# Patient Record
Sex: FEMALE | Race: WHITE | NOT HISPANIC OR LATINO | Employment: OTHER | URBAN - METROPOLITAN AREA
[De-identification: names, ages, dates, MRNs, and addresses within clinical notes are randomized per-mention and may not be internally consistent; named-entity substitution may affect disease eponyms.]

---

## 2017-06-23 ENCOUNTER — HOSPITAL ENCOUNTER (OUTPATIENT)
Dept: FAMILY MEDICINE CLINIC | Facility: CLINIC | Age: 27
Setting detail: SPECIMEN
Discharge: HOME | End: 2017-06-23

## 2017-06-23 LAB
ALBUMIN SERPL-MCNC: 4.6 G/DL (ref 3.5–4.8)
ALBUMIN/GLOB SERPL: 1.5 {RATIO} (ref 1–1.7)
ALP SERPL-CCNC: 35 IU/L (ref 32–91)
ALT SERPL-CCNC: 20 IU/L (ref 14–54)
ANION GAP SERPL CALC-SCNC: 13.2 MMOL/L (ref 10–20)
AST SERPL-CCNC: 23 IU/L (ref 15–41)
BASOPHILS # BLD AUTO: 0 10*3/UL (ref 0–0.2)
BASOPHILS NFR BLD AUTO: 0 % (ref 0–2)
BILIRUB SERPL-MCNC: 0.7 MG/DL (ref 0.3–1.2)
BUN SERPL-MCNC: 11 MG/DL (ref 8–20)
BUN/CREAT SERPL: 13.8 (ref 5.4–26.2)
CALCIUM SERPL-MCNC: 10 MG/DL (ref 8.9–10.3)
CHLORIDE SERPL-SCNC: 103 MMOL/L (ref 101–111)
CHOLEST SERPL-MCNC: 160 MG/DL
CHOLEST/HDLC SERPL: 3 {RATIO}
CONV CO2: 27 MMOL/L (ref 22–32)
CONV LDL CHOLESTEROL DIRECT: 87 MG/DL (ref 0–100)
CONV TOTAL PROTEIN: 7.7 G/DL (ref 6.1–7.9)
CREAT UR-MCNC: 0.8 MG/DL (ref 0.4–1)
DIFFERENTIAL METHOD BLD: (no result)
EOSINOPHIL # BLD AUTO: 0.1 10*3/UL (ref 0–0.3)
EOSINOPHIL # BLD AUTO: 3 % (ref 0–3)
ERYTHROCYTE [DISTWIDTH] IN BLOOD BY AUTOMATED COUNT: 13.5 % (ref 11.5–14.5)
GLOBULIN UR ELPH-MCNC: 3.1 G/DL (ref 2.5–3.8)
GLUCOSE SERPL-MCNC: 93 MG/DL (ref 65–99)
HCT VFR BLD AUTO: 42 % (ref 35–49)
HDLC SERPL-MCNC: 54 MG/DL
HGB BLD-MCNC: 14.2 G/DL (ref 12–15)
LDLC/HDLC SERPL: 1.6 {RATIO}
LIPID INTERPRETATION: NORMAL
LYMPHOCYTES # BLD AUTO: 1.6 10*3/UL (ref 0.8–4.8)
LYMPHOCYTES NFR BLD AUTO: 30 % (ref 18–42)
MCH RBC QN AUTO: 29 PG (ref 26–32)
MCHC RBC AUTO-ENTMCNC: 33.7 G/DL (ref 32–36)
MCV RBC AUTO: 86.2 FL (ref 80–94)
MONOCYTES # BLD AUTO: 0.4 10*3/UL (ref 0.1–1.3)
MONOCYTES NFR BLD AUTO: 8 % (ref 2–11)
NEUTROPHILS # BLD AUTO: 3 10*3/UL (ref 2.3–8.6)
NEUTROPHILS NFR BLD AUTO: 59 % (ref 50–75)
NRBC BLD AUTO-RTO: 0 /100{WBCS}
NRBC/RBC NFR BLD MANUAL: 0 10*3/UL
PLATELET # BLD AUTO: 216 10*3/UL (ref 150–450)
PMV BLD AUTO: 8.2 FL (ref 7.4–10.4)
POTASSIUM SERPL-SCNC: 4.2 MMOL/L (ref 3.6–5.1)
RBC # BLD AUTO: 4.87 10*6/UL (ref 4–5.4)
SODIUM SERPL-SCNC: 139 MMOL/L (ref 136–144)
TRIGL SERPL-MCNC: 66 MG/DL
TSH SERPL-ACNC: 1.66 UIU/ML (ref 0.34–5.6)
VIT B12 SERPL-MCNC: 691 PG/ML (ref 180–914)
VLDLC SERPL CALC-MCNC: 19.2 MG/DL
WBC # BLD AUTO: 5.2 10*3/UL (ref 4.5–11.5)

## 2018-03-13 ENCOUNTER — HOSPITAL ENCOUNTER (OUTPATIENT)
Dept: FAMILY MEDICINE CLINIC | Facility: CLINIC | Age: 28
Setting detail: SPECIMEN
Discharge: HOME | End: 2018-03-13

## 2018-03-13 ENCOUNTER — HOSPITAL ENCOUNTER (OUTPATIENT)
Dept: GENERAL RADIOLOGY | Facility: HOSPITAL | Age: 28
Discharge: HOME | End: 2018-03-13

## 2019-08-07 ENCOUNTER — OFFICE VISIT (OUTPATIENT)
Dept: FAMILY MEDICINE CLINIC | Facility: CLINIC | Age: 29
End: 2019-08-07

## 2019-08-07 VITALS
HEART RATE: 73 BPM | DIASTOLIC BLOOD PRESSURE: 88 MMHG | OXYGEN SATURATION: 98 % | WEIGHT: 140 LBS | SYSTOLIC BLOOD PRESSURE: 123 MMHG | TEMPERATURE: 97.9 F | HEIGHT: 65 IN | BODY MASS INDEX: 23.32 KG/M2

## 2019-08-07 DIAGNOSIS — M79.89 LEFT AXILLARY SWELLING: Primary | ICD-10-CM

## 2019-08-07 LAB
BASOPHILS # BLD AUTO: 0.1 10*3/MM3 (ref 0–0.2)
BASOPHILS NFR BLD AUTO: 0.7 % (ref 0–1.5)
DEPRECATED RDW RBC AUTO: 41.6 FL (ref 37–54)
EOSINOPHIL # BLD AUTO: 0.1 10*3/MM3 (ref 0–0.4)
EOSINOPHIL NFR BLD AUTO: 2 % (ref 0.3–6.2)
ERYTHROCYTE [DISTWIDTH] IN BLOOD BY AUTOMATED COUNT: 13.7 % (ref 12.3–15.4)
HCT VFR BLD AUTO: 40 % (ref 34–46.6)
HGB BLD-MCNC: 12.9 G/DL (ref 12–15.9)
LYMPHOCYTES # BLD AUTO: 1.8 10*3/MM3 (ref 0.7–3.1)
LYMPHOCYTES NFR BLD AUTO: 23.9 % (ref 19.6–45.3)
MCH RBC QN AUTO: 27.6 PG (ref 26.6–33)
MCHC RBC AUTO-ENTMCNC: 32.2 G/DL (ref 31.5–35.7)
MCV RBC AUTO: 85.6 FL (ref 79–97)
MONOCYTES # BLD AUTO: 0.5 10*3/MM3 (ref 0.1–0.9)
MONOCYTES NFR BLD AUTO: 7 % (ref 5–12)
NEUTROPHILS # BLD AUTO: 5 10*3/MM3 (ref 1.7–7)
NEUTROPHILS NFR BLD AUTO: 66.4 % (ref 42.7–76)
NRBC BLD AUTO-RTO: 0 /100 WBC (ref 0–0.2)
PLATELET # BLD AUTO: 232 10*3/MM3 (ref 140–450)
PMV BLD AUTO: 7.7 FL (ref 6–12)
RBC # BLD AUTO: 4.67 10*6/MM3 (ref 3.77–5.28)
WBC NRBC COR # BLD: 7.5 10*3/MM3 (ref 3.4–10.8)

## 2019-08-07 PROCEDURE — 99213 OFFICE O/P EST LOW 20 MIN: CPT | Performed by: NURSE PRACTITIONER

## 2019-08-07 PROCEDURE — 85025 COMPLETE CBC W/AUTO DIFF WBC: CPT | Performed by: NURSE PRACTITIONER

## 2019-08-07 RX ORDER — MULTIVIT WITH MINERALS/LUTEIN
TABLET ORAL DAILY
COMMUNITY
End: 2020-04-15

## 2019-08-07 NOTE — PROGRESS NOTES
"Subjective   Demetra Lal is a 28 y.o. female.       HPI   Pt. Is here today with c/o a \"knot\" under her left arm with a rash.  She reports this has been there on and off for over a year.  She is currently 2 months postpartum.  She made her GYN aware of this problem while she was pregnant.  Given her mother's hx. Of breast cancer, an U/S and labwork were ordered; pt. Reports she was told it was only a benign cyst.   OB/GYN ([facility]).  She has continued to be bothered by this area; she says it \"flares up\" and becomes swollen and tender; the rash or reddness is present with the flare ups.  Last episode was 2 days ago.  She is currently breastfeeding.  She denies any fevers.      The following portions of the patient's history were reviewed and updated as appropriate: allergies, current medications, past family history, past medical history, past social history, past surgical history and problem list.    Review of Systems   Constitutional: Negative for activity change, appetite change, chills, diaphoresis, fatigue, fever, unexpected weight gain and unexpected weight loss.   HENT: Negative for congestion, ear pain, sinus pressure, sore throat, trouble swallowing and voice change.    Eyes: Negative for blurred vision, photophobia, pain, discharge, redness, itching and visual disturbance.   Respiratory: Negative for cough, shortness of breath and wheezing.    Cardiovascular: Negative for chest pain and palpitations.   Gastrointestinal: Negative for abdominal distention, abdominal pain, constipation, diarrhea, nausea and vomiting.   Endocrine: Negative for cold intolerance and heat intolerance.   Genitourinary: Negative for decreased urine volume, dysuria, frequency, hematuria and urgency.   Musculoskeletal: Negative for arthralgias, gait problem, joint swelling and myalgias.   Skin: Positive for rash (left axilla) and skin lesions (left axilla). Negative for color change.   Allergic/Immunologic: Negative for " environmental allergies and food allergies.   Neurological: Negative for dizziness, weakness, headache and confusion.   Hematological: Negative for adenopathy.   Psychiatric/Behavioral: Negative for depressed mood. The patient is not nervous/anxious.        Objective   Physical Exam   Constitutional: She is oriented to person, place, and time. She appears well-developed and well-nourished. No distress.   HENT:   Head: Normocephalic and atraumatic.   Eyes: EOM are normal. Pupils are equal, round, and reactive to light.   Neck: Normal range of motion. Neck supple. No JVD present. No thyromegaly present.   Cardiovascular: Normal rate, regular rhythm and normal heart sounds.   Pulmonary/Chest: Effort normal and breath sounds normal.   Abdominal: Soft. Bowel sounds are normal. She exhibits no distension. There is no tenderness.   Musculoskeletal: Normal range of motion.   Lymphadenopathy:     She has no cervical adenopathy.   Neurological: She is alert and oriented to person, place, and time.   Skin: Skin is warm and dry. No rash (no rash noted in left axilla currently; does have a 5-6mm freely moveable, nontender nodule; no erythema or warmth. ) noted. No erythema.   Psychiatric: She has a normal mood and affect.         Assessment/Plan   Demetra was seen today for mass and rash.    Diagnoses and all orders for this visit:    Left axillary swelling  Comments:  Will get U/S and CBC today;  consider referral to Gen. Surg.   Orders:  -     US Axilla Left; Future  -     CBC Auto Differential

## 2019-08-09 ENCOUNTER — HOSPITAL ENCOUNTER (OUTPATIENT)
Dept: ULTRASOUND IMAGING | Facility: HOSPITAL | Age: 29
Discharge: HOME | End: 2019-08-09
Admitting: NURSE PRACTITIONER

## 2019-08-09 DIAGNOSIS — M79.89 LEFT AXILLARY SWELLING: ICD-10-CM

## 2019-08-09 PROCEDURE — 76882 US LMTD JT/FCL EVL NVASC XTR: CPT

## 2019-08-14 DIAGNOSIS — R59.1 LYMPHADENOPATHY: Primary | ICD-10-CM

## 2019-08-20 ENCOUNTER — OFFICE VISIT (OUTPATIENT)
Dept: SURGERY | Facility: CLINIC | Age: 29
End: 2019-08-20

## 2019-08-20 VITALS
HEIGHT: 65 IN | WEIGHT: 129.2 LBS | HEART RATE: 83 BPM | TEMPERATURE: 97.9 F | DIASTOLIC BLOOD PRESSURE: 81 MMHG | OXYGEN SATURATION: 99 % | SYSTOLIC BLOOD PRESSURE: 114 MMHG | BODY MASS INDEX: 21.52 KG/M2

## 2019-08-20 DIAGNOSIS — R59.0 AXILLARY LYMPHADENOPATHY: Primary | ICD-10-CM

## 2019-08-20 PROBLEM — F41.9 ANXIETY: Status: ACTIVE | Noted: 2017-06-22

## 2019-08-20 PROCEDURE — 99204 OFFICE O/P NEW MOD 45 MIN: CPT | Performed by: SURGERY

## 2019-08-20 RX ORDER — FERROUS SULFATE 325(65) MG
325 TABLET ORAL
COMMUNITY
End: 2020-03-20

## 2019-08-20 NOTE — PROGRESS NOTES
"Whitney Lal is a 28 y.o. female.   Chief Complaint   Patient presents with   • Swollen Glands     left armpit     /81 (BP Location: Right arm, Patient Position: Sitting, Cuff Size: Adult)   Pulse 83   Temp 97.9 °F (36.6 °C) (Oral)   Ht 165.1 cm (65\")   Wt 58.6 kg (129 lb 3.2 oz)   SpO2 99%   BMI 21.50 kg/m²     HISTORY OF PRESENT ILLNESS:  28-year-old lady who has been referred to me from her primary provider for evaluation of left axillary lymphadenopathy.  She says that she has had issues in this location for about the last 2 years.  She says that it has intermittent swelling and when it does swell up it causes pain.  The pain is usually worse with strenuous activity like exercise and is alleviated by rest and ibuprofen.  She does have some redness when it flares up but has never had any spontaneous drainage or needed any procedures in this location.  Over the last 6 months she does say that this is flared back up and has been giving her similar symptoms.  She has had a work-up which is included a left axillary ultrasound which demonstrated a 2.1 x 1 cm lymph node with benign characteristics and a second lymph node adjacent to this location is 1.5 x 0.7 cm.  She had this worked up a couple of years ago including a left breast ultrasound targeting the left upper outer quadrant including the axillary tail which was negative and did not comment on any abnormality.  She recently had her second child and has been breast-feeding over the course of the last 6 months and has had some intermittent breast discomfort but no infections or mastitis.  She does say that she intermittently has some pain in the mid outer aspect of her left breast.    There is a strong family history of breast cancer including a recent diagnosis of breast cancer in her mother at the age of late 40s and has been taking care of at the [facility].  She also reports multiple other family members with cancer " including her maternal grandfather who had breast cancer.  She also endorses multiple family members with lung cancer and also breast cancer on her paternal side of the family.  She says that her mother underwent genetic testing but was negative for BRCA mutations, but I do not have access to these reports at this time.      Outpatient Encounter Medications as of 8/20/2019   Medication Sig Dispense Refill   • ascorbic acid (VITAMIN C) 1000 MG tablet Take  by mouth Daily.     • ferrous sulfate 325 (65 FE) MG tablet Take 325 mg by mouth Daily With Breakfast.     • sertraline (ZOLOFT) 50 MG tablet TAKE TABLET BY MOUTH DAILY FOR 4 TO 7 DAYS THEN INCREASE TO 1 TABLET DAILY IF NECESSARY  12     No facility-administered encounter medications on file as of 8/20/2019.          The following portions of the patient's history were reviewed and updated as appropriate: allergies, current medications, past family history, past medical history, past social history, past surgical history and problem list.    Review of Systems  A complete review of systems been obtained is positive for depression headache gas abdominal pain and the rash in the left axilla but otherwise the review of systems has been negative.  Objective   Physical Exam   Constitutional: She appears well-developed and well-nourished.   HENT:   Head: Normocephalic and atraumatic.   Eyes: Conjunctivae and EOM are normal.   Neck: Neck supple.   Cardiovascular: Normal rate and regular rhythm.   No murmur heard.  Pulmonary/Chest: Effort normal and breath sounds normal. No respiratory distress.   She has some breast asymmetry right side larger than left with no skin changes.  In the left outer aspect of her mid left breast there is about a 1 to 2 cm area of fullness with mild tenderness to palpation and no overlying skin changes.  Within the left axilla there is a large palpable mass.  There is some slight redness along her skin folds but no evidence of infection.  Within  the right breast there is no appreciable masses and there is no right axillary lymphadenopathy appreciated.   Abdominal: Soft. She exhibits no distension. There is no tenderness.   Musculoskeletal: She exhibits no edema or deformity.   Lymphadenopathy:     She has no cervical adenopathy.   Neurological: She is alert. No cranial nerve deficit.   Skin: Skin is warm and dry.   Psychiatric: She has a normal mood and affect. Her behavior is normal.         Assessment/Plan   Demetra was seen today for swollen glands.    Diagnoses and all orders for this visit:    Axillary lymphadenopathy  -     Mammo Screening Bilateral With CAD; Future  -     US Breast Bilateral Complete; Future    Patient was seen today for left axillary lymphadenopathy.  She does have a significant family history of breast cancer and she says that she has never had any breast imaging.  Although there were benign characteristics on the left axillary ultrasound a 2 cm axillary lymph node that is symptomatic in addition to the fullness in the left outer aspect of her left breast does warrant investigation for breast cancer.  I will start with mammogram and if her breasts are too dense may consider ultrasound versus MRI.  If all these are negative I may proceed with an excisional biopsy.  We have discussed the risks and benefits of watchful waiting versus excisional biopsy including the risk of lymphocele or lymphedema by removing these lymph nodes.  She says that her mother has gone through lymphedema after her axillary surgery so she understands the risks.  We will tentatively schedule her for for 2-week follow-up.     Tunde Brown MD  8/20/2019  1:30 PM

## 2019-08-21 DIAGNOSIS — R59.0 AXILLARY LYMPHADENOPATHY: Primary | ICD-10-CM

## 2019-09-10 ENCOUNTER — HOSPITAL ENCOUNTER (OUTPATIENT)
Dept: MAMMOGRAPHY | Facility: HOSPITAL | Age: 29
Discharge: HOME | End: 2019-09-10
Admitting: SURGERY

## 2019-09-10 ENCOUNTER — HOSPITAL ENCOUNTER (OUTPATIENT)
Dept: ULTRASOUND IMAGING | Facility: HOSPITAL | Age: 29
Discharge: HOME | End: 2019-09-10

## 2019-09-10 DIAGNOSIS — R59.0 AXILLARY LYMPHADENOPATHY: ICD-10-CM

## 2019-09-10 DIAGNOSIS — R22.32 MASS OF LEFT AXILLA: ICD-10-CM

## 2019-09-10 PROCEDURE — 76882 US LMTD JT/FCL EVL NVASC XTR: CPT

## 2019-09-10 PROCEDURE — 77066 DX MAMMO INCL CAD BI: CPT

## 2019-09-10 PROCEDURE — 76642 ULTRASOUND BREAST LIMITED: CPT

## 2019-09-10 PROCEDURE — G0279 TOMOSYNTHESIS, MAMMO: HCPCS

## 2019-09-12 ENCOUNTER — OFFICE VISIT (OUTPATIENT)
Dept: SURGERY | Facility: CLINIC | Age: 29
End: 2019-09-12

## 2019-09-12 VITALS
OXYGEN SATURATION: 98 % | HEART RATE: 67 BPM | TEMPERATURE: 97.9 F | DIASTOLIC BLOOD PRESSURE: 71 MMHG | SYSTOLIC BLOOD PRESSURE: 116 MMHG

## 2019-09-12 DIAGNOSIS — R59.0 AXILLARY LYMPHADENOPATHY: Primary | ICD-10-CM

## 2019-09-12 PROCEDURE — 99213 OFFICE O/P EST LOW 20 MIN: CPT | Performed by: SURGERY

## 2019-09-12 NOTE — PROGRESS NOTES
Subjective   Demetra Lal is a 28 y.o. female.   28-year-old lady here to follow-up on the images obtained since her last office visit.  She says that she has had no changes in her left arm discomfort and that it still intermittently flares up and causes discomfort and then will ease off and cause no pain at all.  She has never had any drainage under her arm.  The ultrasound and mammogram did not show any evidence of abnormality in the breasts including in the left breast lump at the 2 to 3 o'clock position 5 cm from the nipple and has sonographically benign-appearing lymph nodes within the left axilla.      Objective   /71   Pulse 67   Temp 97.9 °F (36.6 °C) (Oral)   LMP 08/29/2019   SpO2 98%   Physical Exam   Constitutional: She appears well-developed and well-nourished.   HENT:   Head: Normocephalic and atraumatic.   Cardiovascular: Normal rate.   Pulmonary/Chest: Effort normal. No respiratory distress.   Lymphadenopathy:   Within the left axilla there is no appreciable sebaceous cyst or mass.  There are no firm matted lymph nodes that are confidently palpable.  In the right axilla there is a similar contour and no significant masses are appreciated.       Assessment/Plan   Demetra was seen today for follow-up.    Diagnoses and all orders for this visit:    Axillary lymphadenopathy  -     Ambulatory Referral to Oncology    Since these lymph nodes have a benign appearance on ultrasound and there is no evidence of breast cancer based on her mammogram and ultrasounds I have spent time today counseling her about the risks and benefits of proceeding with an excisional lymph node biopsy versus watchful waiting.  Since these are not immediately palpable and obvious I do think that there is some risk to performing a axillary excisional lymph node biopsy including the risk of a seroma wound complication like infection or even the risk of lymphedema.  She does have a concern about this since her mother has  dealt with lymphedema as part of her treatment for breast cancer.  The lymph nodes are minimally symptomatic and so at this point she and I both feel that it is not worth the risk of the biopsy.  Because she does have a strong family history of breast cancer although there has been no identified genetic mutation based on her report I have offered to refer her over to Dr. Oswald to discuss high risk breast cancer screening including obtaining an MRI.  She says that she was previously scheduled to see Dr. Oswald in the past for the same reason but after her mother's genetic profile came back and did not have an identifiable genetic mutation that she canceled the appointment.  For now we will have her follow-up with me as needed for any changes in her left axillary symptoms and defer to Dr. Oswald to discuss the risks and benefits of beginning or deferring MRI screening.     Tunde Brown MD  9/12/2019  4:38 PM

## 2019-10-02 NOTE — PROGRESS NOTES
Hematology/Oncology Outpatient Consultation    Patient name: Demetra Lal  : 1990  MRN: [id number]  Primary Care Physician: Suzanne Harris APRN  Referring Physician: Suzanne Harris APRN  Reason For Consult:     Chief Complaint   Patient presents with   • Consult     Axillary lymphadenopathy       History of Present Illness:  Is a 28-year-old female his mother developed breast cancer at the age of 48.  Patient developed left axillary node for 1 year ago.  Patient had delivered a baby boy about 5 months ago.  She has noted left axillary swelling on and off sometimes associated with inflammation over the past 1 year.  She has occasional pain with weight.  She was at least seen by Dr. Tunde Brown for the left axillary yousif swelling and he performed breast exam and found a nodule on the left breast at the 2 to 3 o'clock position 5 cm from the nipple.  Patient also is currently nursing her infant and states that the left breast lump comes and goes.  She denies any bloody nipple discharge or breast pain.  For the above complaints she had these imaging studies:  · 2019-ultrasound of the left axilla on lymph nodes that appeared benign largest measuring 2.1 cm x 1.1 cm.  · 9/10/2019-ultrasound of the left breast did not show any sonographic correlate to the palpable left breast lump which was noted at the 2 to 3 o'clock position 5 cm from the nipple.  Patient also had bilateral diagnostic digital mammogram which did not show any mammographic correlate to the palpable breast lump.  The right breast was normal.      Past Medical History:   Diagnosis Date   • Allergic    • Anemia    • Anemia    • Anxiety    • Asthma    • Celiac disease    • Depression    • Headache        No past surgical history on file.      Current Outpatient Medications:   •  ascorbic acid (VITAMIN C) 1000 MG tablet, Take  by mouth Daily., Disp: , Rfl:   •  Cholecalciferol (VITAMIN D) 1000 units tablet, Take 1,000 Units by mouth  Daily., Disp: , Rfl:   •  ferrous sulfate 325 (65 FE) MG tablet, Take 325 mg by mouth Daily With Breakfast., Disp: , Rfl:   •  Flax Oil-Fish Oil-Borage Oil (FISH-FLAX-BORAGE PO), Take  by mouth., Disp: , Rfl:   •  IRON PO, Take 65 mg by mouth Daily., Disp: , Rfl:   •  LYSINE PO, Take 500 mg by mouth Daily., Disp: , Rfl:   •  NON FORMULARY, P[employer] cleanse, Disp: , Rfl:   •  Prenatal Vit-Fe Fumarate-FA (PRENATAL, CLASSIC, VITAMIN) 28-0.8 MG tablet tablet, Take  by mouth Daily., Disp: , Rfl:   •  Probiotic Product (PROBIOTIC PO), Take  by mouth., Disp: , Rfl:   •  sertraline (ZOLOFT) 50 MG tablet, TAKE TABLET BY MOUTH DAILY FOR 4 TO 7 DAYS THEN INCREASE TO 1 TABLET DAILY IF NECESSARY, Disp: , Rfl: 12  •  Unable to find, 1 each 1 (One) Time. Celiac + Gut Health, Disp: , Rfl:   •  vitamin B-6 (PYRIDOXINE) 100 MG tablet, Take 100 mg by mouth Daily., Disp: , Rfl:     Allergies   Allergen Reactions   • Gluten Meal Swelling         There is no immunization history on file for this patient.    Family History   Problem Relation Age of Onset   • Diabetes Mother    • Miscarriages / Stillbirths Mother    • Breast cancer Mother 48   • Hypertension Father    • Lung cancer Maternal Grandmother 45        62   • Breast cancer Maternal Grandfather        Cancer-related family history includes Breast cancer in her maternal grandfather; Breast cancer (age of onset: 48) in her mother; Lung cancer (age of onset: 45) in her maternal grandmother.    Social History     Tobacco Use   • Smoking status: Never Smoker   • Smokeless tobacco: Never Used   Substance Use Topics   • Alcohol use: Yes     Frequency: 2-4 times a month     Drinks per session: 1 or 2   • Drug use: No       ROS:    Review of Systems   Constitutional: Negative for chills and fever.   HENT: Negative for ear pain, mouth sores, nosebleeds and sore throat.    Eyes: Negative for photophobia and visual disturbance.   Respiratory: Negative for wheezing and stridor.   "  Cardiovascular: Negative for chest pain and palpitations.   Gastrointestinal: Negative for abdominal pain, diarrhea, nausea and vomiting.   Endocrine: Negative for cold intolerance and heat intolerance.   Genitourinary: Negative for dysuria and hematuria.   Musculoskeletal: Negative for joint swelling and neck stiffness.   Skin: Negative for color change and rash.   Neurological: Negative for seizures and syncope.   Hematological: Negative for adenopathy.        No obvious bleeding   Psychiatric/Behavioral: Negative for agitation, confusion and hallucinations.       Objective:    Vitals:    10/03/19 1047   BP: 126/75   Pulse: 77   Resp: 18   Temp: 98.2 °F (36.8 °C)   Weight: 59.1 kg (130 lb 6.4 oz)   Height: 165.1 cm (65\")   PainSc: 0-No pain       ECOG  (0) Fully active, able to carry on all predisease performance without restriction    Physical Exam:    Physical Exam   Constitutional: She is oriented to person, place, and time. No distress.   HENT:   Head: Normocephalic and atraumatic.   Eyes: Conjunctivae and EOM are normal. Right eye exhibits no discharge. Left eye exhibits no discharge. No scleral icterus.   Neck: Normal range of motion. Neck supple. No thyromegaly present.   Cardiovascular: Normal rate, regular rhythm and normal heart sounds. Exam reveals no gallop and no friction rub.   Pulmonary/Chest: Effort normal. No stridor. No respiratory distress. She has no wheezes. Right breast exhibits no inverted nipple, no mass, no nipple discharge, no skin change and no tenderness. Left breast exhibits no inverted nipple, no mass, no nipple discharge, no skin change and no tenderness.   Abdominal: Soft. Bowel sounds are normal. She exhibits no mass. There is no tenderness. There is no rebound and no guarding.   Musculoskeletal: Normal range of motion. She exhibits no tenderness.   Lymphadenopathy:     She has no cervical adenopathy.   Neurological: She is alert and oriented to person, place, and time. She " exhibits normal muscle tone.   Skin: Skin is warm. No rash noted. She is not diaphoretic. No erythema.   Psychiatric: She has a normal mood and affect. Her behavior is normal.   Nursing note and vitals reviewed.    Bilateral breast exam today I do not palpate any masses, nipple discharge, she does not have a skin discoloration.  Bilateral axilla was negative for lumps.    RECENT LABS  WBC   Date Value Ref Range Status   08/07/2019 7.50 3.40 - 10.80 10*3/mm3 Final     RBC   Date Value Ref Range Status   08/07/2019 4.67 3.77 - 5.28 10*6/mm3 Final     Hemoglobin   Date Value Ref Range Status   08/07/2019 12.9 12.0 - 15.9 g/dL Final     Hematocrit   Date Value Ref Range Status   08/07/2019 40.0 34.0 - 46.6 % Final     MCV   Date Value Ref Range Status   08/07/2019 85.6 79.0 - 97.0 fL Final     MCH   Date Value Ref Range Status   08/07/2019 27.6 26.6 - 33.0 pg Final     MCHC   Date Value Ref Range Status   08/07/2019 32.2 31.5 - 35.7 g/dL Final     RDW   Date Value Ref Range Status   08/07/2019 13.7 12.3 - 15.4 % Final     RDW-SD   Date Value Ref Range Status   08/07/2019 41.6 37.0 - 54.0 fl Final     MPV   Date Value Ref Range Status   08/07/2019 7.7 6.0 - 12.0 fL Final     Platelets   Date Value Ref Range Status   08/07/2019 232 140 - 450 10*3/mm3 Final     Neutrophil %   Date Value Ref Range Status   08/07/2019 66.4 42.7 - 76.0 % Final     Lymphocyte %   Date Value Ref Range Status   08/07/2019 23.9 19.6 - 45.3 % Final     Monocyte %   Date Value Ref Range Status   08/07/2019 7.0 5.0 - 12.0 % Final     Eosinophil %   Date Value Ref Range Status   08/07/2019 2.0 0.3 - 6.2 % Final     Basophil %   Date Value Ref Range Status   08/07/2019 0.7 0.0 - 1.5 % Final     Neutrophils, Absolute   Date Value Ref Range Status   08/07/2019 5.00 1.70 - 7.00 10*3/mm3 Final     Lymphocytes, Absolute   Date Value Ref Range Status   08/07/2019 1.80 0.70 - 3.10 10*3/mm3 Final     Monocytes, Absolute   Date Value Ref Range Status    08/07/2019 0.50 0.10 - 0.90 10*3/mm3 Final     Eosinophils, Absolute   Date Value Ref Range Status   08/07/2019 0.10 0.00 - 0.40 10*3/mm3 Final     Basophils, Absolute   Date Value Ref Range Status   08/07/2019 0.10 0.00 - 0.20 10*3/mm3 Final     nRBC   Date Value Ref Range Status   08/07/2019 0.0 0.0 - 0.2 /100 WBC Final       Lab Results   Component Value Date    GLUCOSE 93 06/23/2017    BUN 11 06/23/2017    CREATININE 0.8 06/23/2017    BCR 13.8 06/23/2017    K 4.2 06/23/2017    CO2 27 06/23/2017    CALCIUM 10.0 06/23/2017    ALBUMIN 4.6 06/23/2017    LABIL2 1.5 06/23/2017    AST 23 06/23/2017    ALT 20 06/23/2017         Assessment/Plan     There are no diagnoses linked to this encounter.    1. Left axillary yousif enlargement with ultrasound findings suggestive of benign nodes  2. History of left breast nodule now resolved may be a milk duct related.  Bilateral clinical breast exam was unremarkable  3. Family history of breast cancer in multiple maternal relatives including her mom at age of 48, maternal grandfather, maternal great-grandmother, and maternal great aunts all had breast cancer.  Patient's mother had genetic testing which was negative.  Patient has been encouraged to bring the reports in.    Discussion:    Schedule patient for bilateral breast MRI.  We will also notify radiology department about her nursing status.  She understands the limitation of mammogram also with nursing.  The left axillary lymph node appears benign based on ultrasound report.  Patient had been seen by .  Biopsy has not been recommended.  I have encouraged her to return with a copy of her mother's genetic results.  Given her strong family history patient risk may be increased.  Will complete Macy Larsen risk assessment after her mother's genetic results have been reviewed      I have reviewed labs results, imaging, vitals, and medications with the patient today.  Will follow up in 3 weeks  with me.        Patient  verbalized understanding and is in agreement of the above plan.        Part of this document was scribed by Shelbi Molina RN, BSN.        Much of the above report is an electronic transcription/translation of the spoken language to printed text using Dragon Software. As such, the subtleties and finesse of the spoken language may permit erroneous, or at times, nonsensical words or phrases to be inadvertently transcribed; thus changes may be made at a later date to rectify these errors.

## 2019-10-03 ENCOUNTER — APPOINTMENT (OUTPATIENT)
Dept: LAB | Facility: HOSPITAL | Age: 29
End: 2019-10-03

## 2019-10-03 ENCOUNTER — CONSULT (OUTPATIENT)
Dept: ONCOLOGY | Facility: CLINIC | Age: 29
End: 2019-10-03

## 2019-10-03 VITALS
HEIGHT: 65 IN | DIASTOLIC BLOOD PRESSURE: 75 MMHG | TEMPERATURE: 98.2 F | BODY MASS INDEX: 21.73 KG/M2 | SYSTOLIC BLOOD PRESSURE: 126 MMHG | RESPIRATION RATE: 18 BRPM | WEIGHT: 130.4 LBS | HEART RATE: 77 BPM

## 2019-10-03 DIAGNOSIS — N64.4 MASTALGIA: Primary | ICD-10-CM

## 2019-10-03 PROCEDURE — 99205 OFFICE O/P NEW HI 60 MIN: CPT | Performed by: INTERNAL MEDICINE

## 2019-10-03 RX ORDER — PRENATAL VIT NO.126/IRON/FOLIC 28MG-0.8MG
TABLET ORAL DAILY
COMMUNITY
End: 2020-03-20

## 2019-10-03 RX ORDER — MULTIVITAMIN WITH IRON
100 TABLET ORAL DAILY
COMMUNITY
End: 2020-04-15

## 2019-10-04 ENCOUNTER — TELEPHONE (OUTPATIENT)
Dept: ONCOLOGY | Facility: CLINIC | Age: 29
End: 2019-10-04

## 2019-10-04 DIAGNOSIS — N64.4 MASTALGIA: Primary | ICD-10-CM

## 2019-10-04 DIAGNOSIS — R92.8 ABNORMAL ULTRASOUND OF BREAST: ICD-10-CM

## 2019-10-04 NOTE — TELEPHONE ENCOUNTER
I spoke with the patient at her last visit and she had concern with doing the Breast MRI that Dr. Oswald has ordered due to the cost.  The patient stated that she may have difficulty paying for this.  I spoke with Dr. Oswald and she stated that the patient would need this because  felt a lump and it was seen on the ultrasound.  The patient voiced understanding.  I let her know that someone would call her to schedule this.  Instructed her to speak with the billing department of [facility] so they could assess her financial situation to help with the cost.  The patient asked if she could call her insurance.  I let her know that it would be fine to speak to them as well.

## 2019-10-14 ENCOUNTER — TELEPHONE (OUTPATIENT)
Dept: ONCOLOGY | Facility: CLINIC | Age: 29
End: 2019-10-14

## 2019-10-14 NOTE — TELEPHONE ENCOUNTER
Ms. Lal left message to cancel her appt w/ Dr. Oswald.  Returned call.  She will call back at later date to reschedule.

## 2019-10-15 ENCOUNTER — APPOINTMENT (OUTPATIENT)
Dept: MRI IMAGING | Facility: HOSPITAL | Age: 29
End: 2019-10-15

## 2019-12-17 ENCOUNTER — OFFICE VISIT (OUTPATIENT)
Dept: FAMILY MEDICINE CLINIC | Facility: CLINIC | Age: 29
End: 2019-12-17

## 2019-12-17 VITALS
HEART RATE: 78 BPM | BODY MASS INDEX: 21.66 KG/M2 | SYSTOLIC BLOOD PRESSURE: 125 MMHG | HEIGHT: 65 IN | TEMPERATURE: 98.2 F | OXYGEN SATURATION: 98 % | WEIGHT: 130 LBS | DIASTOLIC BLOOD PRESSURE: 73 MMHG

## 2019-12-17 DIAGNOSIS — R05.9 COUGH: ICD-10-CM

## 2019-12-17 DIAGNOSIS — J01.90 ACUTE NON-RECURRENT SINUSITIS, UNSPECIFIED LOCATION: Primary | ICD-10-CM

## 2019-12-17 PROCEDURE — 99213 OFFICE O/P EST LOW 20 MIN: CPT | Performed by: NURSE PRACTITIONER

## 2019-12-17 RX ORDER — BENZONATATE 100 MG/1
100 CAPSULE ORAL 3 TIMES DAILY PRN
Qty: 30 CAPSULE | Refills: 0 | Status: SHIPPED | OUTPATIENT
Start: 2019-12-17 | End: 2019-12-17

## 2019-12-17 RX ORDER — AMOXICILLIN AND CLAVULANATE POTASSIUM 875; 125 MG/1; MG/1
1 TABLET, FILM COATED ORAL 2 TIMES DAILY
Qty: 20 TABLET | Refills: 0 | Status: SHIPPED | OUTPATIENT
Start: 2019-12-17 | End: 2019-12-27

## 2019-12-17 NOTE — PROGRESS NOTES
"Whitney Lal is a 29 y.o. female.     Pt is here today with c/o nasal congestion and cough.  Pt states that her symptoms started about 2.5 weeks ago.  Her 6mo son was diagnosed with RSV.  She states that her symptoms have been worsening.  She has tried using some breathing treatments, without much relief.    She reports that cough is worse at night and in the morning, but still coughs frequently throughout the day.  She has increased fatigue.  She has been coughing up thick phlegm.  She has had a fever on and off.  She has had nasal congestion and drainage.  Reports a sore throat, ear pressure, and body aches.  She has tried taking dayquil with Vicks, tylenol cold and flu, emergenC, OTC cough syrup.       The following portions of the patient's history were reviewed and updated as appropriate: allergies, current medications, past family history, past medical history, past social history, past surgical history and problem list.    Review of Systems   Constitutional: Positive for chills, fatigue and fever.   HENT: Positive for congestion, ear pain, postnasal drip, rhinorrhea, sinus pressure and sore throat.    Eyes: Negative for blurred vision and double vision.   Respiratory: Positive for cough, chest tightness, shortness of breath and wheezing.    Cardiovascular: Negative for chest pain and palpitations.   Gastrointestinal: Positive for abdominal pain and diarrhea. Negative for constipation, nausea and vomiting.   Musculoskeletal: Positive for myalgias.   Neurological: Positive for dizziness (off and on, mild) and headache.       Objective   /73 (BP Location: Right arm, Patient Position: Sitting, Cuff Size: Adult)   Pulse 78   Temp 98.2 °F (36.8 °C) (Oral)   Ht 165.1 cm (65\")   Wt 59 kg (130 lb)   SpO2 98%   BMI 21.63 kg/m²   Physical Exam   Constitutional: She is oriented to person, place, and time. She appears well-developed and well-nourished. No distress.   HENT:   Head: " Normocephalic and atraumatic.   Right Ear: External ear normal.   Left Ear: External ear normal.   Mouth/Throat: Oropharynx is clear and moist.   Eyes: Pupils are equal, round, and reactive to light. Conjunctivae and EOM are normal. Right eye exhibits no discharge. Left eye exhibits no discharge.   Neck: Normal range of motion. Neck supple.   Cardiovascular: Normal rate and regular rhythm.   Pulmonary/Chest: Effort normal and breath sounds normal. No respiratory distress. She has no wheezes. She has no rales.   Abdominal: Soft. Normal appearance and bowel sounds are normal. She exhibits no distension. There is no tenderness.   Musculoskeletal: Normal range of motion.   Neurological: She is alert and oriented to person, place, and time.   Skin: Skin is warm and dry. She is not diaphoretic.   Psychiatric: She has a normal mood and affect. Her behavior is normal. Thought content normal.   Vitals reviewed.        Assessment/Plan     Diagnoses and all orders for this visit:    1. Acute non-recurrent sinusitis, unspecified location (Primary)  Comments:  start augmentin  breastfeeding so unable to take stahist or tessalon perles  drink plenty of water  Orders:  -     amoxicillin-clavulanate (AUGMENTIN) 875-125 MG per tablet; Take 1 tablet by mouth 2 (Two) Times a Day for 10 days.  Dispense: 20 tablet; Refill: 0  -     Discontinue: Chlorcyclizine-Pseudoephed (STAHIST AD) 25-60 MG tablet; Take 1 tablet by mouth 2 (Two) Times a Day.  Dispense: 42 tablet; Refill: 0    2. Cough  Comments:  complete antibiotic  likely post nasal drip  call if no improvement  Orders:  -     Discontinue: benzonatate (TESSALON PERLES) 100 MG capsule; Take 1 capsule by mouth 3 (Three) Times a Day As Needed for Cough.  Dispense: 30 capsule; Refill: 0

## 2020-03-09 ENCOUNTER — TELEPHONE (OUTPATIENT)
Dept: ONCOLOGY | Facility: CLINIC | Age: 30
End: 2020-03-09

## 2020-03-09 NOTE — TELEPHONE ENCOUNTER
Pt was last seen 10/3/19  Pt called requesting to reschedule missed MRI on 10/15/19. Pt also asked if she needs to see Dr. Oswald to get a new order put in for the MRI.   Dr. Randhawa, pt OB , is wanting pt to have MRI done.     Pt cancelled appt for lab and an appt with Dr. Oswald on 10/15/19 and wants to know if she needs to reschedule these also.    Please call pt in regards to these appts and what pt needs to do at [phone].

## 2020-03-20 ENCOUNTER — TELEPHONE (OUTPATIENT)
Dept: FAMILY MEDICINE CLINIC | Facility: CLINIC | Age: 30
End: 2020-03-20

## 2020-03-20 ENCOUNTER — OFFICE VISIT (OUTPATIENT)
Dept: FAMILY MEDICINE CLINIC | Facility: CLINIC | Age: 30
End: 2020-03-20

## 2020-03-20 VITALS
HEIGHT: 65 IN | WEIGHT: 125 LBS | SYSTOLIC BLOOD PRESSURE: 126 MMHG | OXYGEN SATURATION: 97 % | DIASTOLIC BLOOD PRESSURE: 80 MMHG | BODY MASS INDEX: 20.83 KG/M2 | HEART RATE: 89 BPM | TEMPERATURE: 98.6 F

## 2020-03-20 DIAGNOSIS — R21 RASH AND NONSPECIFIC SKIN ERUPTION: Primary | ICD-10-CM

## 2020-03-20 DIAGNOSIS — F41.9 ANXIETY: ICD-10-CM

## 2020-03-20 PROCEDURE — 99213 OFFICE O/P EST LOW 20 MIN: CPT | Performed by: NURSE PRACTITIONER

## 2020-03-20 RX ORDER — DOXYCYCLINE 100 MG/1
100 TABLET ORAL 2 TIMES DAILY
Qty: 28 TABLET | Refills: 0 | Status: SHIPPED | OUTPATIENT
Start: 2020-03-20 | End: 2020-04-03

## 2020-03-20 RX ORDER — SERTRALINE HYDROCHLORIDE 100 MG/1
100 TABLET, FILM COATED ORAL DAILY
Qty: 30 TABLET | Refills: 3 | Status: SHIPPED | OUTPATIENT
Start: 2020-03-20 | End: 2020-04-15 | Stop reason: SDUPTHER

## 2020-03-20 NOTE — TELEPHONE ENCOUNTER
I would recommend she come in so I can see the rash; it would be difficult to know what to send her without seeing it.   Assure her that patients are being screened prior to coming in the office and it they area suspected of the virus they are directed to another location for screening and testing.

## 2020-03-20 NOTE — TELEPHONE ENCOUNTER
Patient called with complaints of a rash on her face for about 1 month. She had thought it was postpartum acne but states it is inflamed and itching now. She is trying to avoid coming to the office with everything going on and wants to know if there is anything you can send her?

## 2020-03-20 NOTE — PROGRESS NOTES
"Whitney Lal is a 29 y.o. female.       HPI   Pt. Is here today with concern of a rash on her face.  She says she has had the current rash for about a month.  She does report having trouble with acne outbreaks in the past and thought that is what this was.  The rash in on both cheeks but primarily on the right side.  She reports the spots form \"somethine like a cyst under the skin and get very tender\"; if they do open and drain, sometimes that looks like pus or clear fluid.  She says once they do this then it becomes itchy.    She doesn't have this rash on any other body areas.  No use of new facial products or makeup.  She says she uses a mild soap from [employer] to wash her face.     She is also concerned with an increased anxiety.  She has been on sertraline 50 mg daily for over a year; was started on this in her prenatal period and continued it postpartum.  She is now postpartum one year and no longer breastfeeding.  She finds her anxiety level has increased; she feels more anxious; more worry.  She feels the sertraline was good initially but no longer as effective.  Denies any SI or HI.     The following portions of the patient's history were reviewed and updated as appropriate: allergies, current medications, past family history, past medical history, past social history, past surgical history and problem list.    Review of Systems   Constitutional: Negative for activity change, appetite change, chills, diaphoresis, fatigue, fever, unexpected weight gain and unexpected weight loss.   Eyes: Negative for pain, discharge, redness and itching.   Respiratory: Negative for cough, chest tightness, shortness of breath and wheezing.    Cardiovascular: Negative for chest pain, palpitations and leg swelling.   Gastrointestinal: Negative for diarrhea, nausea and vomiting.   Skin: Positive for rash (face).   Neurological: Negative for dizziness, light-headedness, headache and confusion. "   Psychiatric/Behavioral: Positive for stress. Negative for self-injury, sleep disturbance, suicidal ideas and depressed mood. The patient is nervous/anxious.        Objective   Physical Exam   Constitutional: She is oriented to person, place, and time. She appears well-developed and well-nourished.   HENT:   Head: Normocephalic and atraumatic.   Nose: Nose normal.   Mouth/Throat: Oropharynx is clear and moist.   Eyes: Pupils are equal, round, and reactive to light. Conjunctivae are normal.   Neck: Normal range of motion. Neck supple.   Cardiovascular: Normal rate, regular rhythm and normal heart sounds.   No murmur heard.  Pulmonary/Chest: Effort normal and breath sounds normal. No respiratory distress. She has no wheezes. She exhibits no tenderness.   Lymphadenopathy:     She has no cervical adenopathy.   Neurological: She is alert and oriented to person, place, and time.   Skin: Skin is warm and dry.   Erythremic maculopapular rash on cheeks; majority is on right side of face with a few lesions on left cheek.   Single erythematous papule vs. comedone in left eyebrow.     Psychiatric: She has a normal mood and affect.   Vitals reviewed.        Assessment/Plan   Demetra was seen today for rash.    Diagnoses and all orders for this visit:    Rash and nonspecific skin eruption  Comments:  Suspect cystic acne; given Doxycycline  Referral to Derm  Discussed using a mild facial wash such as Cetaphil  Orders:  -     Ambulatory Referral to Dermatology  -     doxycycline (ADOXA) 100 MG tablet; Take 1 tablet by mouth 2 (Two) Times a Day for 14 days.    Anxiety  Comments:  Will increase sertraline to 100 mg daily  Phone follow up in 1-2 weeks   Orders:  -     sertraline (Zoloft) 100 MG tablet; Take 1 tablet by mouth Daily.

## 2020-04-15 ENCOUNTER — TELEPHONE (OUTPATIENT)
Dept: FAMILY MEDICINE CLINIC | Facility: CLINIC | Age: 30
End: 2020-04-15

## 2020-04-15 ENCOUNTER — TELEMEDICINE (OUTPATIENT)
Dept: FAMILY MEDICINE CLINIC | Facility: CLINIC | Age: 30
End: 2020-04-15

## 2020-04-15 DIAGNOSIS — R21 RASH AND NONSPECIFIC SKIN ERUPTION: Primary | ICD-10-CM

## 2020-04-15 DIAGNOSIS — F41.9 ANXIETY: ICD-10-CM

## 2020-04-15 PROCEDURE — 99213 OFFICE O/P EST LOW 20 MIN: CPT | Performed by: NURSE PRACTITIONER

## 2020-04-15 RX ORDER — SERTRALINE HYDROCHLORIDE 100 MG/1
100 TABLET, FILM COATED ORAL DAILY
Qty: 30 TABLET | Refills: 3 | Status: SHIPPED | OUTPATIENT
Start: 2020-04-15 | End: 2020-04-21

## 2020-04-15 RX ORDER — DOXYCYCLINE 100 MG/1
100 TABLET ORAL 2 TIMES DAILY
Qty: 20 TABLET | Refills: 0 | Status: SHIPPED | OUTPATIENT
Start: 2020-04-15 | End: 2020-04-25

## 2020-04-15 RX ORDER — METHYLPREDNISOLONE 4 MG/1
TABLET ORAL
Qty: 21 TABLET | Refills: 0 | Status: SHIPPED | OUTPATIENT
Start: 2020-04-15 | End: 2020-04-21

## 2020-04-15 NOTE — PATIENT INSTRUCTIONS
Resume the antibiotic (doxycycline); will also start a steroid dose pack.  Keep follow up with Dermatology on Friday.

## 2020-04-15 NOTE — TELEPHONE ENCOUNTER
Pt finished antibiotic. Hasn't seen derm yet.  Skin was better after antibiotic but is now painful,itchy,inflammed, unbearable  looks worse than ever. Covering face and neck is itchy. Any recommendations? Do you want me to have her set up a video visit?

## 2020-04-15 NOTE — PROGRESS NOTES
Subjective   Demetra Lal is a 29 y.o. female.       HPI   You have chosen to receive care through a telehealth visit.  Do you consent to use a video/audio connection for your medical care today? Yes    Pt. doing video visit follow up today on facial rash and anxiety.    1) Facial rash - at the previous visit she was given doxycycline for rash that was consistent with cystic acne.  She completed the 2 week course of medication and reports that symptoms almost cleared completely.  Within a few days of stopping the med, she noticed the rash returning.  It is now back just as it was before antibiotics.  She reports the rash does itch; itch seems worse at night.  She does have some acne that have opened and drained pus; not currently.  She has new rash developing on her forehead and a few spots on her left cheek.  The majority of the rash is on her right cheek and chin.  She has switched to a milder facial wash and body wash.    She doesn't get to see Derm until Friday.      2) Anxiety - at previous visit her sertraline was increased to 100 mg daily.  She reports she is doing well on this dose and feels symptoms of anxiety have improved.  Denies any SI or HI.      The following portions of the patient's history were reviewed and updated as appropriate: allergies, current medications, past family history, past medical history, past social history, past surgical history and problem list.    Review of Systems   Constitutional: Negative for activity change, appetite change, chills, diaphoresis, fatigue, fever, unexpected weight gain and unexpected weight loss.   Eyes: Negative for pain, discharge, redness and itching.   Respiratory: Negative for cough, shortness of breath and wheezing.    Cardiovascular: Negative for chest pain and palpitations.   Gastrointestinal: Negative for nausea and vomiting.   Skin: Positive for rash (face).   Neurological: Negative for dizziness and headache.   Psychiatric/Behavioral: Negative  for dysphoric mood. The patient is not nervous/anxious.        Objective   Physical Exam   Constitutional: She is oriented to person, place, and time. She appears well-developed and well-nourished. No distress.   HENT:   Head: Normocephalic and atraumatic.   Pulmonary/Chest: Effort normal. No respiratory distress.   Neurological: She is alert and oriented to person, place, and time.   Skin:   Erythematous papular rash noted on right cheek and chin extending across forehead.  Not as erythematous on forehead.   No active drainage/oozing.    Several single papules noted on left cheek.    Psychiatric: She has a normal mood and affect. Her speech is normal and behavior is normal. Judgment and thought content normal. Cognition and memory are normal. She expresses no homicidal and no suicidal ideation.         Assessment/Plan   Demetra was seen today for rash and anxiety.    Diagnoses and all orders for this visit:    Rash and nonspecific skin eruption  Comments:  Will send in doxycycline and medrol pack.   Keep follow up with Derm on Friday  Orders:  -     doxycycline (ADOXA) 100 MG tablet; Take 1 tablet by mouth 2 (Two) Times a Day for 10 days.  -     methylPREDNISolone (MEDROL, LISSETTE,) 4 MG tablet; Take as directed on package instructions.    Anxiety  Comments:  Stable.   Continue current medication; refill sent.  Orders:  -     sertraline (Zoloft) 100 MG tablet; Take 1 tablet by mouth Daily.    15 minutes spent with this encounter today.

## 2020-04-21 ENCOUNTER — LAB (OUTPATIENT)
Dept: FAMILY MEDICINE CLINIC | Facility: CLINIC | Age: 30
End: 2020-04-21

## 2020-04-21 ENCOUNTER — TELEPHONE (OUTPATIENT)
Dept: FAMILY MEDICINE CLINIC | Facility: CLINIC | Age: 30
End: 2020-04-21

## 2020-04-21 ENCOUNTER — OFFICE VISIT (OUTPATIENT)
Dept: FAMILY MEDICINE CLINIC | Facility: CLINIC | Age: 30
End: 2020-04-21

## 2020-04-21 VITALS
TEMPERATURE: 97.7 F | HEART RATE: 67 BPM | OXYGEN SATURATION: 99 % | BODY MASS INDEX: 20.99 KG/M2 | WEIGHT: 126 LBS | DIASTOLIC BLOOD PRESSURE: 92 MMHG | SYSTOLIC BLOOD PRESSURE: 132 MMHG | HEIGHT: 65 IN

## 2020-04-21 DIAGNOSIS — R61 SWEATING INCREASE: ICD-10-CM

## 2020-04-21 DIAGNOSIS — F41.9 ANXIETY: Primary | ICD-10-CM

## 2020-04-21 DIAGNOSIS — F41.9 ANXIETY: ICD-10-CM

## 2020-04-21 DIAGNOSIS — R53.83 FATIGUE, UNSPECIFIED TYPE: ICD-10-CM

## 2020-04-21 LAB
CORTIS SERPL-MCNC: 10.92 MCG/DL
VIT B12 BLD-MCNC: 1456 PG/ML (ref 211–946)

## 2020-04-21 PROCEDURE — 99214 OFFICE O/P EST MOD 30 MIN: CPT | Performed by: NURSE PRACTITIONER

## 2020-04-21 PROCEDURE — 82533 TOTAL CORTISOL: CPT | Performed by: NURSE PRACTITIONER

## 2020-04-21 PROCEDURE — 82652 VIT D 1 25-DIHYDROXY: CPT | Performed by: NURSE PRACTITIONER

## 2020-04-21 PROCEDURE — 82607 VITAMIN B-12: CPT | Performed by: NURSE PRACTITIONER

## 2020-04-21 PROCEDURE — 36415 COLL VENOUS BLD VENIPUNCTURE: CPT | Performed by: NURSE PRACTITIONER

## 2020-04-21 RX ORDER — IRON,CARBONYL/ASCORBIC ACID 100-250 MG
TABLET ORAL
COMMUNITY
End: 2020-05-06

## 2020-04-21 RX ORDER — DOXYCYCLINE HYCLATE 100 MG/1
CAPSULE ORAL
COMMUNITY
Start: 2020-04-15 | End: 2020-04-21 | Stop reason: SDUPTHER

## 2020-04-21 RX ORDER — ELECTROLYTES/DEXTROSE
SOLUTION, ORAL ORAL
COMMUNITY

## 2020-04-21 RX ORDER — FLUOXETINE HYDROCHLORIDE 20 MG/1
20 CAPSULE ORAL DAILY
Qty: 30 CAPSULE | Refills: 1 | Status: SHIPPED | OUTPATIENT
Start: 2020-04-21 | End: 2020-06-22 | Stop reason: SDUPTHER

## 2020-04-21 NOTE — TELEPHONE ENCOUNTER
Pt. recently has labs with her GYN (Dr.. Randhawa).  Can you call and have them fax copies of the results over.   Thank you!

## 2020-04-21 NOTE — PROGRESS NOTES
Subjective   Demetra Lal is a 29 y.o. female.       HPI   Pt. Is here today to follow up on anxiety/depression.  She has been on sertraline for awhile and it was recently increased to 100 mg daily.  She felt symptoms improved some initially with the recent increase but within the past week feels more depression/anxiety.    She says she has been dealing with a lot of symptoms for months and thinks they culd be related.  She talked with her GYN (Dr. Randhawa) a month or so ago about all the symptoms and she had labs drawn.  She knows a thyroid level was checked and female hormones; she was told all results were normal.    They symptoms she continues to feel are an increase in anxiety/depression 2 weeks prior to a period; increased fatigue (sleeping late; taking naps; not sleeping well at night); increased sweating; muscle aches; more difficult to recover from a workout; no energy; increased facial hair growth.  She has also been dealing with significant cystic acne outbreaks.      She wants to discuss switching from sertraline, as she no longer feels it is helping her.  Denies any SI or HI.      The following portions of the patient's history were reviewed and updated as appropriate: allergies, current medications, past family history, past medical history, past social history, past surgical history and problem list.    Review of Systems   Constitutional: Positive for diaphoresis and fatigue. Negative for activity change, appetite change, chills, fever, unexpected weight gain and unexpected weight loss.   Eyes: Negative for blurred vision, double vision and visual disturbance.   Respiratory: Negative for cough, chest tightness, shortness of breath and wheezing.    Cardiovascular: Negative for chest pain, palpitations and leg swelling.   Gastrointestinal: Negative for abdominal pain, blood in stool, constipation, diarrhea, nausea, vomiting and indigestion.   Endocrine: Negative for cold intolerance, heat  intolerance, polydipsia, polyphagia and polyuria.   Genitourinary: Negative for dysuria, flank pain, frequency, pelvic pain and urgency.   Musculoskeletal: Positive for myalgias. Negative for arthralgias, back pain and joint swelling.   Skin: Positive for skin lesions (cystic acne on face). Negative for rash.   Neurological: Positive for headache. Negative for dizziness, weakness, numbness and confusion.   Hematological: Negative for adenopathy.   Psychiatric/Behavioral: Positive for sleep disturbance and depressed mood. The patient is nervous/anxious.        Objective   Physical Exam   Constitutional: She is oriented to person, place, and time. She appears well-developed and well-nourished. No distress.   HENT:   Head: Normocephalic and atraumatic.   Eyes: Pupils are equal, round, and reactive to light. Conjunctivae are normal.   Neck: Normal range of motion. Neck supple. No thyromegaly present.   Cardiovascular: Normal rate, regular rhythm and normal heart sounds.   No murmur heard.  Pulmonary/Chest: Effort normal and breath sounds normal. No respiratory distress. She has no wheezes.   Abdominal: Soft. Bowel sounds are normal. She exhibits no distension. There is no tenderness.   Musculoskeletal: She exhibits no edema.   Lymphadenopathy:     She has no cervical adenopathy.   Neurological: She is alert and oriented to person, place, and time.   Skin: Skin is warm and dry. Capillary refill takes less than 2 seconds. She is not diaphoretic.   Cystic acne lesions on right side of face   Psychiatric: Her speech is normal and behavior is normal. Judgment and thought content normal. Her mood appears anxious. Cognition and memory are normal. She does not exhibit a depressed mood. She expresses no homicidal and no suicidal ideation.   Vitals reviewed.        Assessment/Plan   Demetra was seen today for anxiety and depression.    Diagnoses and all orders for this visit:    Anxiety  Comments:  Will switch from sertraline to  fluoxetine 20 mg daily.   Follow up in 2 weeks or sooner if needed.   Orders:  -     Cortisol; Future  -     Vitamin B12; Future  -     Vitamin D 1,25 Dihydroxy  -     FLUoxetine (PROzac) 20 MG capsule; Take 1 capsule by mouth Daily.    Fatigue, unspecified type  Comments:  Will get copies of recent labs from Dr. Randhawa.   Additional labs to be done today.   Orders:  -     Cortisol; Future  -     Vitamin B12; Future  -     Vitamin D 1,25 Dihydroxy    Sweating increase  Comments:  Additional labs to be drawn today.   Orders:  -     Cortisol; Future  -     Vitamin B12; Future  -     Vitamin D 1,25 Dihydroxy

## 2020-04-23 DIAGNOSIS — R53.83 LOW ENERGY: ICD-10-CM

## 2020-04-23 DIAGNOSIS — R53.83 FATIGUE, UNSPECIFIED TYPE: Primary | ICD-10-CM

## 2020-04-23 DIAGNOSIS — R61 SWEATING INCREASE: ICD-10-CM

## 2020-04-23 LAB — 1,25(OH)2D3 SERPL-MCNC: 69.6 PG/ML (ref 19.9–79.3)

## 2020-05-06 ENCOUNTER — OFFICE VISIT (OUTPATIENT)
Dept: FAMILY MEDICINE CLINIC | Facility: CLINIC | Age: 30
End: 2020-05-06

## 2020-05-06 VITALS
BODY MASS INDEX: 20.97 KG/M2 | OXYGEN SATURATION: 97 % | TEMPERATURE: 97.8 F | SYSTOLIC BLOOD PRESSURE: 113 MMHG | HEART RATE: 74 BPM | DIASTOLIC BLOOD PRESSURE: 72 MMHG | WEIGHT: 126 LBS

## 2020-05-06 DIAGNOSIS — F41.9 ANXIETY: Primary | ICD-10-CM

## 2020-05-06 DIAGNOSIS — R53.83 FATIGUE, UNSPECIFIED TYPE: ICD-10-CM

## 2020-05-06 DIAGNOSIS — L70.0 CYSTIC ACNE: ICD-10-CM

## 2020-05-06 PROCEDURE — 99213 OFFICE O/P EST LOW 20 MIN: CPT | Performed by: NURSE PRACTITIONER

## 2020-05-06 RX ORDER — DOXYCYCLINE 100 MG/1
100 TABLET ORAL 2 TIMES DAILY
Qty: 20 TABLET | Refills: 0 | Status: SHIPPED | OUTPATIENT
Start: 2020-05-06 | End: 2020-05-16

## 2020-05-06 NOTE — PROGRESS NOTES
Subjective   Demetra Lal is a 29 y.o. female.       HPI   Pt. Is here today for 2 week follow up on anxiety.  She was switched from sertraline to fluoxetine at the last visit.  She has done well with the change and is tolerating the new medication.  She reports her anxiety has improved somewhat but she has also made a few other lifestyle changes.  She has cut back on her caffeine intake in the past two weeks and noticed improvements.  She has started an otc CBD oil a week ago.  She has set up an appointment with a therapist in the upcoming week.    She denies any CP or palpitations; BP has improved.  Denies any SOA, dizziness, headache, trouble with vision.  Continues to have issues with fatigue and not sleeping well at night; did try otc melatonin without relief.  She does not snore.    Labs from previous visit were reviewed again today.  CBC, CMP, Vitamin B12 and D, and Cortisol levels were in normal ranges.  Thyroid labs from GYN in late Feb, 2020 were in normal ranges. She has a follow up appointment with Endocrinology in 2 weeks.    She continues to have cystic acne on her face.  Was seen by Derm and told to stay on Doxycycline until her next appointment with them, which is also in about 2 weeks.  She went to pharmacy to pick refill on the medication and the pharmacy misunderstood her directions to allow for the generic and cancelled out the script.  Dermatology was contacted to re-send the script but they have not responded.  She asks for refill on this med today.    The following portions of the patient's history were reviewed and updated as appropriate: allergies, current medications, past family history, past medical history, past social history, past surgical history and problem list.    Review of Systems   Constitutional: Positive for fatigue. Negative for activity change, appetite change, chills, diaphoresis, fever, unexpected weight gain and unexpected weight loss.   Respiratory: Negative for  cough, chest tightness, shortness of breath and wheezing.    Cardiovascular: Negative for chest pain, palpitations and leg swelling.   Gastrointestinal: Negative for abdominal pain, diarrhea, nausea and vomiting.   Musculoskeletal: Negative for arthralgias and myalgias.   Skin: Positive for rash (cystic acne on face).   Neurological: Negative for dizziness, weakness, headache and confusion.   Psychiatric/Behavioral: Positive for sleep disturbance and stress. Negative for self-injury, suicidal ideas and depressed mood. The patient is not nervous/anxious.        Objective   Physical Exam   Constitutional: She is oriented to person, place, and time. She appears well-developed and well-nourished. No distress.   Cardiovascular: Normal rate, regular rhythm and normal heart sounds.   No murmur heard.  Pulmonary/Chest: Effort normal and breath sounds normal. No respiratory distress. She has no wheezes. She exhibits no tenderness.   Abdominal: Soft. Bowel sounds are normal. She exhibits no distension. There is no tenderness.   Musculoskeletal: She exhibits no edema.   Neurological: She is alert and oriented to person, place, and time.   Skin: Skin is warm and dry. Rash (cystic acne noted on right side of face from cheek to chin) noted.   Psychiatric: She has a normal mood and affect. Her speech is normal and behavior is normal. Judgment and thought content normal. Cognition and memory are normal. She expresses no homicidal and no suicidal ideation.   Vitals reviewed.        Assessment/Plan   Demetra was seen today for anxiety and fatigue.    Diagnoses and all orders for this visit:    Anxiety  Comments:  Stable.    Cont. fluoxetine 20 mg daily.   Cont. with therapy  Phone follow up in 4-6 weeks.     Fatigue, unspecified type  Comments:  Labs reviewed.   Follow up has been set up with Endo in 2 weeks.     Cystic acne  Comments:  Refilled Doxycycline.   Keep followup with Derm.  Orders:  -     doxycycline (ADOXA) 100 MG  tablet; Take 1 tablet by mouth 2 (Two) Times a Day for 10 days.               Answers for HPI/ROS submitted by the patient on 5/4/2020   What is the primary reason for your visit?: Other  Please describe your symptoms.: Follow up on depression and elevated blood pressure I think  Have you had these symptoms before?: Yes  How long have you been having these symptoms?: 1-4 weeks ago  Please list any medications you are currently taking for this condition.: Fluoxetine 20 mg, , I also started taking CBD oil.  Please describe any probable cause for these symptoms. : No clue.

## 2020-05-22 ENCOUNTER — TELEPHONE (OUTPATIENT)
Dept: ENDOCRINOLOGY | Facility: CLINIC | Age: 30
End: 2020-05-22

## 2020-05-26 ENCOUNTER — OFFICE VISIT (OUTPATIENT)
Dept: ENDOCRINOLOGY | Facility: CLINIC | Age: 30
End: 2020-05-26

## 2020-05-26 VITALS
HEIGHT: 65 IN | SYSTOLIC BLOOD PRESSURE: 102 MMHG | DIASTOLIC BLOOD PRESSURE: 68 MMHG | OXYGEN SATURATION: 99 % | TEMPERATURE: 98.1 F | WEIGHT: 129 LBS | BODY MASS INDEX: 21.49 KG/M2 | HEART RATE: 68 BPM

## 2020-05-26 DIAGNOSIS — N92.6 IRREGULAR PERIODS/MENSTRUAL CYCLES: ICD-10-CM

## 2020-05-26 DIAGNOSIS — R53.83 OTHER FATIGUE: Primary | ICD-10-CM

## 2020-05-26 PROCEDURE — 99244 OFF/OP CNSLTJ NEW/EST MOD 40: CPT | Performed by: INTERNAL MEDICINE

## 2020-05-26 RX ORDER — PNV NO.95/FERROUS FUM/FOLIC AC 28MG-0.8MG
TABLET ORAL
COMMUNITY

## 2020-05-26 RX ORDER — DOXYCYCLINE HYCLATE 50 MG/1
100 TABLET, FILM COATED ORAL 2 TIMES DAILY
COMMUNITY
End: 2020-10-15

## 2020-05-26 RX ORDER — ERGOCALCIFEROL (VITAMIN D2) 50 MCG
2000 CAPSULE ORAL DAILY
Qty: 100 CAPSULE | Refills: 4 | Status: SHIPPED | OUTPATIENT
Start: 2020-05-26 | End: 2020-06-22

## 2020-05-26 NOTE — PROGRESS NOTES
Endocrine Consult Outpatient  Referred by Ms. Suzanne Harris NP  for variety of symptoms  Patient Care Team:  Suzanne Harris APRN as PCP - General     Chief Complaint: Excessive fatigue and tiredness    HPI: 29-year-old female with no significant past medical history underwent pregnancy and delivered in [place] 10, 2019.  During her third semester she started having some depression post delivery she has been having significant symptoms of depression, fatigue, tiredness, brain fog, confusion, hair growth on her face and her arms she feels like she is not hungry her weight has been fluctuating.  She has ringing in the ears she has active cystic acne and her menstrual peers been irregular postdelivery.  She also have some migraine headaches as well as bloating.  She works as a  her diet is pretty healthy and she avoids processed foods and starches.  She is not taking any medications with regards to thyroid at this time.    Old records reviewed: Labs from April 21, 2020 showed a B12 level of 1456, cortisol was 10.92, 125 dihydroxy vitamin D was 69.6  Labs from February 26, 2020 showed a 17 hydroxyprogesterone level of 25, total testosterone 14, SHBG was 58, free testosterone 1.0, FSH 3.5, prolactin 9.1, free T4 1.0 TSH was 2.67.    Past Medical History:   Diagnosis Date   • Allergic    • Anemia    • Anemia    • Anxiety    • Asthma    • Celiac disease    • Depression    • Headache        Social History     Socioeconomic History   • Marital status:      Spouse name: Not on file   • Number of children: Not on file   • Years of education: Not on file   • Highest education level: Not on file   Tobacco Use   • Smoking status: Never Smoker   • Smokeless tobacco: Never Used   Substance and Sexual Activity   • Alcohol use: Yes     Frequency: 2-4 times a month     Drinks per session: 1 or 2     Comment: one - two glasses of wine weekly    • Drug use: No   • Sexual activity: Yes     Partners: Male        Family History   Problem Relation Age of Onset   • Diabetes Mother    • Miscarriages / Stillbirths Mother    • Breast cancer Mother 48   • Hypertension Father    • Lung cancer Maternal Grandmother 45        62   • Breast cancer Maternal Grandfather    • Lung cancer Other    • Lung cancer Other    • Lung cancer Other    • Thyroid disease Paternal Grandmother        Allergies   Allergen Reactions   • Gluten Meal Swelling       ROS:   Constitutional:  Admit fatigue, tiredness.    Eyes:  Denies change in visual acuity   HENT:  Denies nasal congestion or sore throat   Respiratory: denies cough, admit shortness of breath.   Cardiovascular:  denies chest pain, edema   GI:  Denies abdominal pain, nausea, vomiting.    :  Denies dysuria   Musculoskeletal:  Denies back pain or joint pain, admit muscle aches   Integument:  Denies dry skin, rash   Neurologic:  Denies headache, focal weakness or sensory changes   Endocrine:  Denies polyuria or polydipsia   Psychiatric:  Admit depression and anxiety      Vitals:    05/26/20 1438   BP: 102/68   Pulse: 68   Temp: 98.1 °F (36.7 °C)   SpO2: 99%        Physical Exam:  GEN: NAD, conversant  EYES: EOMI, PERRL, no conjunctival erythema  NECK: no thyromegaly, full ROM   CV: RRR, no murmurs/rubs/gallops, no peripheral edema  LUNG: CTAB, no wheezes/rales/ronchi  SKIN: no rashes, no acanthosis  MSK: no deformities, full ROM of all extremities  NEURO: no tremors, DTR normal  PSYCH: AOX3, appropriate mood, affect normal      Results Review:     I reviewed the patient's new clinical results.    Lab Results   Component Value Date    GLUCOSE 93 06/23/2017    BUN 11 06/23/2017    CREATININE 0.8 06/23/2017    BCR 13.8 06/23/2017    K 4.2 06/23/2017    CO2 27 06/23/2017    CALCIUM 10.0 06/23/2017    ALBUMIN 4.6 06/23/2017    LABIL2 1.5 06/23/2017    AST 23 06/23/2017    ALT 20 06/23/2017    CHOL 160 06/23/2017    TRIG 66 06/23/2017    HDL 54 06/23/2017    LDL 87 06/23/2017     Lab Results    Component Value Date    CREATININE 0.8 06/23/2017     Lab Results   Component Value Date    TSH 1.66 06/23/2017       Medication Review: Reviewed.       Current Outpatient Medications:   •  Doxycycline Hyclate 50 MG tablet, Take 100 mg by mouth 2 (Two) Times a Day., Disp: , Rfl:   •  ferrous sulfate 325 (65 Fe) MG tablet, Take  by mouth., Disp: , Rfl:   •  Flax Oil-Fish Oil-Borage Oil (FISH-FLAX-BORAGE PO), Take  by mouth., Disp: , Rfl:   •  FLUoxetine (PROzac) 20 MG capsule, Take 1 capsule by mouth Daily., Disp: 30 capsule, Rfl: 1  •  Multiple Vitamins-Minerals (MULTIVITAMIN ADULT) tablet, , Disp: , Rfl:   •  Probiotic Product (PROBIOTIC PO), Take  by mouth., Disp: , Rfl:     Assessment/Plan   Excessive fatigue and tiredness  Postpartum depression  Irregular Periods    Plan:  29-year-old female with no significant past medical history started having symptoms in her third trimester and continued post pregnancy with worsening of fatigue and tiredness and brain fog and confusion as well as some cystic acne and hair growth.  I will get following labs for further evaluation.  Her diet is pretty clean and she is avoiding starches.                    Anthony Post MD FACE.                  Answers for HPI/ROS submitted by the patient on 5/24/2020   What is the primary reason for your visit?: Other  Please describe your symptoms.: Extreme fatigue, Thicker/darker/more hair growth on face and body (especially upper lip and arms), Loud ringing in my ears, Hormonal cystic acne (Was also extremely itchy on my face), Depression and anxiety, Brain fog, Irregular periods, Low sex drive, Lower body temp, Unable to recovery from a workout at a normal intensity (I'm a  and have exercised all the time for years)  Have you had these symptoms before?: No  How long have you been having these symptoms?: Greater than 2 weeks  Please list any medications you are currently taking for this condition.: Prozac for depression,  Antibiotic for hormonal acne  Please describe any probable cause for these symptoms. : Postpartum hormones (I'm almost one year postpartum though), Maybe adrenal or thyroid issues?, I also have Celiac disease.

## 2020-05-27 ENCOUNTER — LAB (OUTPATIENT)
Dept: LAB | Facility: HOSPITAL | Age: 30
End: 2020-05-27

## 2020-05-27 DIAGNOSIS — N92.6 IRREGULAR PERIODS/MENSTRUAL CYCLES: ICD-10-CM

## 2020-05-27 DIAGNOSIS — R53.83 OTHER FATIGUE: ICD-10-CM

## 2020-05-27 LAB
25(OH)D3 SERPL-MCNC: 25.1 NG/ML (ref 30–100)
ALBUMIN SERPL-MCNC: 4.8 G/DL (ref 3.5–5.2)
ALBUMIN/GLOB SERPL: 2.1 G/DL
ALP SERPL-CCNC: 38 U/L (ref 39–117)
ALT SERPL W P-5'-P-CCNC: 12 U/L (ref 1–33)
ANION GAP SERPL CALCULATED.3IONS-SCNC: 8.7 MMOL/L (ref 5–15)
AST SERPL-CCNC: 16 U/L (ref 1–32)
BASOPHILS # BLD AUTO: 0.03 10*3/MM3 (ref 0–0.2)
BASOPHILS NFR BLD AUTO: 0.8 % (ref 0–1.5)
BILIRUB SERPL-MCNC: 0.5 MG/DL (ref 0.2–1.2)
BUN BLD-MCNC: 15 MG/DL (ref 6–20)
BUN/CREAT SERPL: 18.8 (ref 7–25)
CALCIUM SPEC-SCNC: 9.5 MG/DL (ref 8.6–10.5)
CHLORIDE SERPL-SCNC: 102 MMOL/L (ref 98–107)
CO2 SERPL-SCNC: 26.3 MMOL/L (ref 22–29)
CREAT BLD-MCNC: 0.8 MG/DL (ref 0.57–1)
DEPRECATED RDW RBC AUTO: 38.2 FL (ref 37–54)
EOSINOPHIL # BLD AUTO: 0.1 10*3/MM3 (ref 0–0.4)
EOSINOPHIL NFR BLD AUTO: 2.5 % (ref 0.3–6.2)
ERYTHROCYTE [DISTWIDTH] IN BLOOD BY AUTOMATED COUNT: 12 % (ref 12.3–15.4)
FSH SERPL-ACNC: 7.8 MIU/ML
GFR SERPL CREATININE-BSD FRML MDRD: 85 ML/MIN/1.73
GLOBULIN UR ELPH-MCNC: 2.3 GM/DL
GLUCOSE BLD-MCNC: 93 MG/DL (ref 65–99)
HBA1C MFR BLD: 5 % (ref 3.5–5.6)
HCT VFR BLD AUTO: 40 % (ref 34–46.6)
HGB BLD-MCNC: 13.7 G/DL (ref 12–15.9)
IMM GRANULOCYTES # BLD AUTO: 0.01 10*3/MM3 (ref 0–0.05)
IMM GRANULOCYTES NFR BLD AUTO: 0.3 % (ref 0–0.5)
LH SERPL-ACNC: 9.42 MIU/ML
LYMPHOCYTES # BLD AUTO: 1.48 10*3/MM3 (ref 0.7–3.1)
LYMPHOCYTES NFR BLD AUTO: 37.4 % (ref 19.6–45.3)
MCH RBC QN AUTO: 29.8 PG (ref 26.6–33)
MCHC RBC AUTO-ENTMCNC: 34.3 G/DL (ref 31.5–35.7)
MCV RBC AUTO: 87 FL (ref 79–97)
MONOCYTES # BLD AUTO: 0.32 10*3/MM3 (ref 0.1–0.9)
MONOCYTES NFR BLD AUTO: 8.1 % (ref 5–12)
NEUTROPHILS # BLD AUTO: 2.02 10*3/MM3 (ref 1.7–7)
NEUTROPHILS NFR BLD AUTO: 50.9 % (ref 42.7–76)
NRBC BLD AUTO-RTO: 0 /100 WBC (ref 0–0.2)
PLATELET # BLD AUTO: 238 10*3/MM3 (ref 140–450)
PMV BLD AUTO: 9.8 FL (ref 6–12)
POTASSIUM BLD-SCNC: 4.3 MMOL/L (ref 3.5–5.2)
PROGEST SERPL-MCNC: 0.06 NG/ML
PROT SERPL-MCNC: 7.1 G/DL (ref 6–8.5)
RBC # BLD AUTO: 4.6 10*6/MM3 (ref 3.77–5.28)
SODIUM BLD-SCNC: 137 MMOL/L (ref 136–145)
T4 FREE SERPL-MCNC: 1.25 NG/DL (ref 0.93–1.7)
TSH SERPL DL<=0.05 MIU/L-ACNC: 2.18 UIU/ML (ref 0.27–4.2)
WBC NRBC COR # BLD: 3.96 10*3/MM3 (ref 3.4–10.8)

## 2020-05-27 PROCEDURE — 82306 VITAMIN D 25 HYDROXY: CPT

## 2020-05-27 PROCEDURE — 36415 COLL VENOUS BLD VENIPUNCTURE: CPT

## 2020-05-27 PROCEDURE — 83002 ASSAY OF GONADOTROPIN (LH): CPT

## 2020-05-27 PROCEDURE — 84439 ASSAY OF FREE THYROXINE: CPT

## 2020-05-27 PROCEDURE — 84144 ASSAY OF PROGESTERONE: CPT

## 2020-05-27 PROCEDURE — 83036 HEMOGLOBIN GLYCOSYLATED A1C: CPT

## 2020-05-27 PROCEDURE — 86376 MICROSOMAL ANTIBODY EACH: CPT

## 2020-05-27 PROCEDURE — 85025 COMPLETE CBC W/AUTO DIFF WBC: CPT

## 2020-05-27 PROCEDURE — 80053 COMPREHEN METABOLIC PANEL: CPT

## 2020-05-27 PROCEDURE — 84443 ASSAY THYROID STIM HORMONE: CPT

## 2020-05-27 PROCEDURE — 83001 ASSAY OF GONADOTROPIN (FSH): CPT

## 2020-05-28 DIAGNOSIS — F41.9 ANXIETY: ICD-10-CM

## 2020-05-28 DIAGNOSIS — E06.3 HASHIMOTO'S THYROIDITIS: ICD-10-CM

## 2020-05-28 DIAGNOSIS — E55.9 VITAMIN D DEFICIENCY: ICD-10-CM

## 2020-05-28 DIAGNOSIS — R53.83 OTHER FATIGUE: Primary | ICD-10-CM

## 2020-05-28 LAB — THYROPEROXIDASE AB SERPL-ACNC: 145 IU/ML (ref 0–34)

## 2020-05-28 RX ORDER — LEVOTHYROXINE SODIUM 0.03 MG/1
25 TABLET ORAL DAILY
Qty: 30 TABLET | Refills: 3 | Status: SHIPPED | OUTPATIENT
Start: 2020-05-28 | End: 2020-09-21

## 2020-05-28 RX ORDER — CHOLECALCIFEROL (VITAMIN D3) 125 MCG
5000 CAPSULE ORAL DAILY
Qty: 30 CAPSULE | Refills: 3 | Status: SHIPPED | OUTPATIENT
Start: 2020-05-28

## 2020-06-02 ENCOUNTER — HOSPITAL ENCOUNTER (OUTPATIENT)
Dept: INFUSION THERAPY | Facility: HOSPITAL | Age: 30
Discharge: HOME | End: 2020-06-02
Admitting: INTERNAL MEDICINE

## 2020-06-02 VITALS
HEART RATE: 58 BPM | SYSTOLIC BLOOD PRESSURE: 121 MMHG | RESPIRATION RATE: 16 BRPM | OXYGEN SATURATION: 98 % | WEIGHT: 125 LBS | BODY MASS INDEX: 20.83 KG/M2 | TEMPERATURE: 98 F | DIASTOLIC BLOOD PRESSURE: 82 MMHG | HEIGHT: 65 IN

## 2020-06-02 DIAGNOSIS — N92.6 IRREGULAR PERIODS/MENSTRUAL CYCLES: ICD-10-CM

## 2020-06-02 DIAGNOSIS — R53.83 OTHER FATIGUE: ICD-10-CM

## 2020-06-02 LAB
CORTIS 1H P CHAL SERPL-MCNC: 29.93 MCG/DL
CORTIS 30M P ACTH SERPL-MCNC: 26.8 MCG/DL
CORTIS BS SERPL-MCNC: 22.6 MCG/DL (ref 4–22)

## 2020-06-02 PROCEDURE — 96375 TX/PRO/DX INJ NEW DRUG ADDON: CPT

## 2020-06-02 PROCEDURE — 25010000002 COSYNTROPIN PER 0.25 MG: Performed by: INTERNAL MEDICINE

## 2020-06-02 PROCEDURE — 82533 TOTAL CORTISOL: CPT | Performed by: INTERNAL MEDICINE

## 2020-06-02 PROCEDURE — 36415 COLL VENOUS BLD VENIPUNCTURE: CPT

## 2020-06-02 PROCEDURE — G0463 HOSPITAL OUTPT CLINIC VISIT: HCPCS

## 2020-06-02 RX ORDER — COSYNTROPIN 0.25 MG/ML
0.25 INJECTION, POWDER, FOR SOLUTION INTRAMUSCULAR; INTRAVENOUS ONCE
Status: COMPLETED | OUTPATIENT
Start: 2020-06-02 | End: 2020-06-02

## 2020-06-02 RX ADMIN — COSYNTROPIN 0.25 MG: 0.25 INJECTION, POWDER, LYOPHILIZED, FOR SOLUTION INTRAMUSCULAR; INTRAVENOUS at 08:40

## 2020-06-02 NOTE — PATIENT INSTRUCTIONS
ACTH Stimulation Test  Why am I having this test?  The adrenocorticotropic hormone (ACTH) stimulation test is used to measure how well your adrenal glands are working.  What is being tested?  This test checks the levels of cortisol in your blood before and after your adrenal glands are stimulated with ACTH. ACTH is produced by a gland in your brain called the pituitary gland. ACTH stimulates your two adrenal glands, which are located above each kidney. The adrenal glands produce hormones that are released into the blood. One of these hormones is cortisol. Cortisol helps your body to respond to stress. If your adrenal glands are not working well and are not responding to ACTH properly, the test result will show too little cortisol.  What kind of sample is taken?    Two or more blood samples are required for this test. The samples are usually collected by inserting a needle into a blood vessel.  How do I prepare for this test?  · Do not eat or drink anything after midnight on the night before the test or as directed by your health care provider. You may continue to drink water up until the time of your test.  · You may be instructed to avoid certain medicines that can affect cortisol levels, such as those containing estrogen or steroids. Make sure that the health care provider ordering the test is aware of any recent use of steroid hormones, such as prednisone or cortisone injections.  · Follow any additional instructions as directed by your health care provider.  What happens during the test?  This test is usually done in the morning, as your cortisol levels change throughout the day.  1. The first blood sample will be collected. Cortisol will be measured in this sample to provide your starting (baseline) level.  2. You will be given cosyntropin by injection or through an IV. Cosyntropin is similar to ACTH and should cause the adrenal glands to release cortisol into the bloodstream.  ? You may feel a slight flush  after the cosyntropin is given. This is normal.  3. One or more blood samples will be taken at specified intervals (30 or 60 minutes after the cosyntropin injection) in order to measure your cortisol levels after the adrenal gland is stimulated.  4. The test results will be compared to show the amount of cortisol in your blood before and after you were given cosyntropin.  How are the results reported?  Your test results will be reported as values. Your health care provider will compare your results to normal ranges that were established after testing a large group of people (reference ranges). Reference ranges may vary among labs and hospitals. For this test, a common reference range is:  · A baseline cortisol level from 7 mcg/dL to 10 mcg/dL, reaching at least 18 mcg/dL at 60 minutes after stimulation.  What do the results mean?  Results outside the reference range may indicate that you have:  · Adrenal insufficiency. This can be caused by a problem in the adrenal gland itself (primary adrenal insufficiency) or by a problem outside the adrenal gland (secondary adrenal insufficiency).  ? Causes of primary adrenal insufficiency include autoimmune inflammation of the adrenal gland, infection involving the adrenal gland, a tumor that has spread to the adrenal gland, and bleeding into the adrenal gland.  ? Causes of secondary adrenal insufficiency include conditions that cause the pituitary gland to function less than normal (hypopituitarism). This may be due to a specific disease involving the pituitary gland or the use of high-dose steroid medications to treat another medical condition.  Other tests may be needed to find the cause of adrenal gland conditions and confirm a diagnosis.  Talk with your health care provider about what your results mean.  Questions to ask your health care provider  Ask your health care provider, or the department that is doing the test:  · When will my results be ready?  · How will I get my  results?  · What are my treatment options?  · What other tests do I need?  · What are my next steps?  Summary  · The ACTH stimulation test is used to measure how well your adrenal glands are working.  · The test has three steps. First, your blood is tested to measure your baseline cortisol level. Second, you are given cosyntropin to stimulate your adrenal glands to release cortisol. Third, your blood is tested to see how much cortisol is in your blood after stimulation.  · Results outside the normal range may indicate that you have a condition that affects how well your adrenal glands function.  This information is not intended to replace advice given to you by your health care provider. Make sure you discuss any questions you have with your health care provider.  Document Released: 01/20/2012 Document Revised: 04/08/2020 Document Reviewed: 08/21/2018  Elsevier Patient Education © 2020 [employer]

## 2020-06-16 PROBLEM — Z83.511 FH: GLAUCOMA: Status: ACTIVE | Noted: 2017-08-22

## 2020-06-16 PROBLEM — R21 RASH OF FACE: Status: ACTIVE | Noted: 2020-06-16

## 2020-06-22 ENCOUNTER — OFFICE VISIT (OUTPATIENT)
Dept: ENDOCRINOLOGY | Facility: CLINIC | Age: 30
End: 2020-06-22

## 2020-06-22 VITALS
DIASTOLIC BLOOD PRESSURE: 70 MMHG | HEART RATE: 81 BPM | OXYGEN SATURATION: 98 % | HEIGHT: 65 IN | WEIGHT: 131 LBS | SYSTOLIC BLOOD PRESSURE: 122 MMHG | BODY MASS INDEX: 21.83 KG/M2 | TEMPERATURE: 98.6 F

## 2020-06-22 DIAGNOSIS — F41.9 ANXIETY: ICD-10-CM

## 2020-06-22 DIAGNOSIS — E55.9 VITAMIN D DEFICIENCY: ICD-10-CM

## 2020-06-22 DIAGNOSIS — E06.3 HASHIMOTO'S THYROIDITIS: Primary | ICD-10-CM

## 2020-06-22 PROCEDURE — 99213 OFFICE O/P EST LOW 20 MIN: CPT | Performed by: INTERNAL MEDICINE

## 2020-06-22 RX ORDER — FLUOXETINE HYDROCHLORIDE 20 MG/1
20 CAPSULE ORAL DAILY
Qty: 30 CAPSULE | Refills: 1 | Status: SHIPPED | OUTPATIENT
Start: 2020-06-22 | End: 2020-06-23

## 2020-06-22 NOTE — PROGRESS NOTES
Endocrine Progress Note Outpatient     Patient Care Team:  Suzanne Harris APRN as PCP - General    Chief Complaint: Follow-up Hashimoto's    HPI: 29-year-old female who was initially seen here on May 26, 2020 with excessive fatigue and tiredness.  Subsequent work-up confirmed that she has Hashimoto thyroiditis with vitamin D deficiency.  She also have celiac disease.  For Hashimoto's thyroiditis she was started on levothyroxine 25 mcg on May 29, 2020.  She feels like it has helped her and she is feeling better.  Vitamin D deficiency: She is currently on vitamin D 5000 units p.o. daily.  Overall feels much better than before.    Past Medical History:   Diagnosis Date   • Allergic    • Anemia    • Anemia    • Anxiety    • Asthma    • Celiac disease    • Depression    • Hashimoto's disease 2020   • Headache        Social History     Socioeconomic History   • Marital status:      Spouse name: Not on file   • Number of children: Not on file   • Years of education: Not on file   • Highest education level: Not on file   Tobacco Use   • Smoking status: Never Smoker   • Smokeless tobacco: Never Used   Substance and Sexual Activity   • Alcohol use: Yes     Frequency: 2-4 times a month     Drinks per session: 1 or 2     Comment: one - two glasses of wine weekly    • Drug use: No   • Sexual activity: Yes     Partners: Male       Family History   Problem Relation Age of Onset   • Diabetes Mother    • Miscarriages / Stillbirths Mother    • Breast cancer Mother 48   • Hypertension Father    • Lung cancer Maternal Grandmother 45        62   • Breast cancer Maternal Grandfather    • Lung cancer Other    • Lung cancer Other    • Lung cancer Other    • Thyroid disease Paternal Grandmother        Allergies   Allergen Reactions   • Gluten Meal Swelling       ROS:   Constitutional:  Denies fatigue, tiredness.    Eyes:  Denies change in visual acuity   HENT:  Denies nasal congestion or sore throat   Respiratory: denies cough,  shortness of breath.   Cardiovascular:  denies chest pain, edema   GI:  Denies abdominal pain, nausea, vomiting.   Musculoskeletal:  Denies back pain or joint pain   Integument:  Denies dry skin and rash   Neurologic:  Denies headache, focal weakness or sensory changes   Endocrine:  Denies polyuria or polydipsia   Psychiatric:  Denies depression or anxiety      Vitals:    06/22/20 1523   BP: 122/70   Pulse: 81   Temp: 98.6 °F (37 °C)   SpO2: 98%       Physical Exam:  GEN: NAD, conversant  EYES: EOMI, PERRL, no conjunctival erythema  NECK: no thyromegaly, full ROM   CV: RRR, no murmurs/rubs/gallops, no peripheral edema  LUNG: CTAB, no wheezes/rales/ronchi  SKIN: no rashes, no acanthosis  MSK: no deformities, full ROM of all extremities  NEURO: no tremors, DTR normal  PSYCH: AOX3, appropriate mood, affect normal      Results Review:     I reviewed the patient's new clinical results.    Lab Results   Component Value Date    HGBA1C 5.0 05/27/2020      Lab Results   Component Value Date    GLUCOSE 93 05/27/2020    BUN 15 05/27/2020    CREATININE 0.80 05/27/2020    EGFRIFNONA 85 05/27/2020    BCR 18.8 05/27/2020    K 4.3 05/27/2020    CO2 26.3 05/27/2020    CALCIUM 9.5 05/27/2020    ALBUMIN 4.80 05/27/2020    LABIL2 1.5 06/23/2017    AST 16 05/27/2020    ALT 12 05/27/2020    CHOL 160 06/23/2017    TRIG 66 06/23/2017    LDL 87 06/23/2017    HDL 54 06/23/2017     Lab Results   Component Value Date    TSH 2.180 05/27/2020    FREET4 1.25 05/27/2020    THYROIDAB 145 (H) 05/27/2020         Medication Review: Reviewed.       Current Outpatient Medications:   •  Cholecalciferol (VITAMIN D) 125 MCG (5000 UT) capsule capsule, Take 1 capsule by mouth Daily., Disp: 30 capsule, Rfl: 3  •  Doxycycline Hyclate 50 MG tablet, Take 100 mg by mouth 2 (Two) Times a Day., Disp: , Rfl:   •  ferrous sulfate 325 (65 Fe) MG tablet, Take  by mouth., Disp: , Rfl:   •  Flax Oil-Fish Oil-Borage Oil (FISH-FLAX-BORAGE PO), Take  by mouth., Disp: , Rfl:    •  FLUoxetine (PROzac) 20 MG capsule, Take 1 capsule by mouth Daily., Disp: 30 capsule, Rfl: 1  •  levothyroxine (SYNTHROID, LEVOTHROID) 25 MCG tablet, Take 1 tablet by mouth Daily., Disp: 30 tablet, Rfl: 3  •  Multiple Vitamins-Minerals (MULTIVITAMIN ADULT) tablet, , Disp: , Rfl:   •  Multiple Vitamins-Minerals (ZINC PO), Take 30 mg by mouth Daily., Disp: , Rfl:   •  Probiotic Product (PROBIOTIC PO), Take  by mouth., Disp: , Rfl:   •  Vitamin D, Ergocalciferol, 50 MCG (2000 UT) capsule, Take 2,000 Units by mouth Daily., Disp: 100 capsule, Rfl: 4      Assessment/Plan   1.  Hashimoto's thyroiditis: She is currently on levothyroxine 25 mcg p.o. daily.  Clinically feeling much better.  I will continue that dose and check TSH and free T4 in about 3 weeks and make further adjustments.  She does not have any future pregnancy plans.    2.  Vitamin D deficiency: She is currently on vitamin D 5000 units p.o. daily.  We will continue that and follow levels.            Anthony Post MD FACE.

## 2020-06-22 NOTE — TELEPHONE ENCOUNTER
If she's got the 20mg tabs she can just use those to wean back off.  Typically you'd take 1/2 tab every day for a week then 1/2 tab every other day for a week; then 1/2 tab every third day for a week... and so on for a month then stop.

## 2020-06-22 NOTE — PATIENT INSTRUCTIONS
Continue levothyroxine 25 mcg p.o. daily and check TSH and free T4 in 3 weeks  Check TSH free T4, CMP and vitamin D level before follow-up in 3 months  Follow-up in 3 months.  Please call Suzanne Harris to see if you can taper off fluoxetine.

## 2020-06-22 NOTE — TELEPHONE ENCOUNTER
Patient would like to come off of this medication. She is wanting to know if you could send in a lower dose as well as a instructions for how to go about discontinuing the medication?

## 2020-06-23 RX ORDER — FLUOXETINE 20 MG/1
20 TABLET, FILM COATED ORAL DAILY
Qty: 30 TABLET | Refills: 0 | Status: SHIPPED | OUTPATIENT
Start: 2020-06-23 | End: 2020-10-15

## 2020-07-10 ENCOUNTER — LAB (OUTPATIENT)
Dept: LAB | Facility: HOSPITAL | Age: 30
End: 2020-07-10

## 2020-07-10 DIAGNOSIS — E55.9 VITAMIN D DEFICIENCY: ICD-10-CM

## 2020-07-10 DIAGNOSIS — R53.83 OTHER FATIGUE: ICD-10-CM

## 2020-07-10 DIAGNOSIS — E06.3 HASHIMOTO'S THYROIDITIS: ICD-10-CM

## 2020-07-10 DIAGNOSIS — F41.9 ANXIETY: ICD-10-CM

## 2020-07-10 LAB
ALBUMIN SERPL-MCNC: 4.6 G/DL (ref 3.5–5.2)
ALBUMIN/GLOB SERPL: 1.6 G/DL
ALP SERPL-CCNC: 39 U/L (ref 39–117)
ALT SERPL W P-5'-P-CCNC: 18 U/L (ref 1–33)
ANION GAP SERPL CALCULATED.3IONS-SCNC: 10.5 MMOL/L (ref 5–15)
AST SERPL-CCNC: 19 U/L (ref 1–32)
BILIRUB SERPL-MCNC: 0.5 MG/DL (ref 0–1.2)
BUN SERPL-MCNC: 18 MG/DL (ref 6–20)
BUN/CREAT SERPL: 21.2 (ref 7–25)
CALCIUM SPEC-SCNC: 9.7 MG/DL (ref 8.6–10.5)
CHLORIDE SERPL-SCNC: 102 MMOL/L (ref 98–107)
CO2 SERPL-SCNC: 24.5 MMOL/L (ref 22–29)
CREAT SERPL-MCNC: 0.85 MG/DL (ref 0.57–1)
GFR SERPL CREATININE-BSD FRML MDRD: 79 ML/MIN/1.73
GLOBULIN UR ELPH-MCNC: 2.8 GM/DL
GLUCOSE SERPL-MCNC: 94 MG/DL (ref 65–99)
POTASSIUM SERPL-SCNC: 4.3 MMOL/L (ref 3.5–5.2)
PROT SERPL-MCNC: 7.4 G/DL (ref 6–8.5)
SODIUM SERPL-SCNC: 137 MMOL/L (ref 136–145)
T4 FREE SERPL-MCNC: 1.42 NG/DL (ref 0.93–1.7)
TSH SERPL DL<=0.05 MIU/L-ACNC: 2.65 UIU/ML (ref 0.27–4.2)

## 2020-07-10 PROCEDURE — 84443 ASSAY THYROID STIM HORMONE: CPT

## 2020-07-10 PROCEDURE — 80053 COMPREHEN METABOLIC PANEL: CPT

## 2020-07-10 PROCEDURE — 36415 COLL VENOUS BLD VENIPUNCTURE: CPT

## 2020-07-10 PROCEDURE — 84439 ASSAY OF FREE THYROXINE: CPT

## 2020-09-21 RX ORDER — LEVOTHYROXINE SODIUM 25 UG/1
TABLET ORAL
Qty: 30 TABLET | Refills: 3 | Status: SHIPPED | OUTPATIENT
Start: 2020-09-21 | End: 2020-10-15

## 2020-10-08 ENCOUNTER — LAB (OUTPATIENT)
Dept: LAB | Facility: HOSPITAL | Age: 30
End: 2020-10-08

## 2020-10-08 DIAGNOSIS — E06.3 HASHIMOTO'S THYROIDITIS: ICD-10-CM

## 2020-10-08 DIAGNOSIS — E55.9 VITAMIN D DEFICIENCY: ICD-10-CM

## 2020-10-08 LAB
25(OH)D3 SERPL-MCNC: 63.3 NG/ML (ref 30–100)
ALBUMIN SERPL-MCNC: 4.8 G/DL (ref 3.5–5.2)
ALBUMIN/GLOB SERPL: 2.1 G/DL
ALP SERPL-CCNC: 37 U/L (ref 39–117)
ALT SERPL W P-5'-P-CCNC: 11 U/L (ref 1–33)
ANION GAP SERPL CALCULATED.3IONS-SCNC: 9.9 MMOL/L (ref 5–15)
AST SERPL-CCNC: 16 U/L (ref 1–32)
BILIRUB SERPL-MCNC: 0.4 MG/DL (ref 0–1.2)
BUN SERPL-MCNC: 13 MG/DL (ref 6–20)
BUN/CREAT SERPL: 16.5 (ref 7–25)
CALCIUM SPEC-SCNC: 9.8 MG/DL (ref 8.6–10.5)
CHLORIDE SERPL-SCNC: 100 MMOL/L (ref 98–107)
CO2 SERPL-SCNC: 28.1 MMOL/L (ref 22–29)
CREAT SERPL-MCNC: 0.79 MG/DL (ref 0.57–1)
GFR SERPL CREATININE-BSD FRML MDRD: 86 ML/MIN/1.73
GLOBULIN UR ELPH-MCNC: 2.3 GM/DL
GLUCOSE SERPL-MCNC: 89 MG/DL (ref 65–99)
POTASSIUM SERPL-SCNC: 3.8 MMOL/L (ref 3.5–5.2)
PROT SERPL-MCNC: 7.1 G/DL (ref 6–8.5)
SODIUM SERPL-SCNC: 138 MMOL/L (ref 136–145)
T4 FREE SERPL-MCNC: 1.58 NG/DL (ref 0.93–1.7)
TSH SERPL DL<=0.05 MIU/L-ACNC: 2.82 UIU/ML (ref 0.27–4.2)

## 2020-10-08 PROCEDURE — 84439 ASSAY OF FREE THYROXINE: CPT

## 2020-10-08 PROCEDURE — 80053 COMPREHEN METABOLIC PANEL: CPT

## 2020-10-08 PROCEDURE — 36415 COLL VENOUS BLD VENIPUNCTURE: CPT

## 2020-10-08 PROCEDURE — 82306 VITAMIN D 25 HYDROXY: CPT

## 2020-10-08 PROCEDURE — 84443 ASSAY THYROID STIM HORMONE: CPT

## 2020-10-15 ENCOUNTER — OFFICE VISIT (OUTPATIENT)
Dept: ENDOCRINOLOGY | Facility: CLINIC | Age: 30
End: 2020-10-15

## 2020-10-15 VITALS
HEIGHT: 65 IN | HEART RATE: 81 BPM | TEMPERATURE: 98 F | SYSTOLIC BLOOD PRESSURE: 125 MMHG | OXYGEN SATURATION: 98 % | DIASTOLIC BLOOD PRESSURE: 72 MMHG | BODY MASS INDEX: 21.83 KG/M2 | WEIGHT: 131 LBS

## 2020-10-15 DIAGNOSIS — E06.3 HASHIMOTO'S THYROIDITIS: Primary | ICD-10-CM

## 2020-10-15 DIAGNOSIS — E55.9 VITAMIN D DEFICIENCY: ICD-10-CM

## 2020-10-15 PROCEDURE — 99213 OFFICE O/P EST LOW 20 MIN: CPT | Performed by: INTERNAL MEDICINE

## 2020-10-15 RX ORDER — LEVOTHYROXINE SODIUM 25 MCG
25 TABLET ORAL DAILY
Qty: 30 TABLET | Refills: 6 | Status: SHIPPED | OUTPATIENT
Start: 2020-10-15 | End: 2020-11-12

## 2020-10-15 RX ORDER — OFLOXACIN 3 MG/ML
SOLUTION/ DROPS OPHTHALMIC
COMMUNITY
Start: 2020-08-26 | End: 2020-10-15

## 2020-10-15 RX ORDER — OLOPATADINE HYDROCHLORIDE 2 MG/ML
SOLUTION/ DROPS OPHTHALMIC
COMMUNITY
Start: 2020-08-26 | End: 2020-10-15

## 2020-10-15 RX ORDER — TRIFOLIUM PRATENSE, CAPSICUM ANNUUM, PHYTOLACCA DECANDRA, ALUMINUM METALLICUM, ANTIMONIUM CRUDUM, ARGENTUM METALLICUM, ARSENICUM ALBUM, AURUM METALLICUM, BARYTA CARBONICA, BERYLLIUM METALLICUM, BISMUTHUM METALLICUM, BORON, BROMIUM, CADMIUM METALLICUM, CERIUM METALLICUM, CESIUM CHLORIDE, CHROMIUM, COBALTUM METALLICUM, CUPRUM METALLICUM, DYSPROSIUM METALLICUM, ERBIUM METALLICUM, EUROPIUM OXYDATUM, FERRUM METALLICUM, GADOLINIUM METALLICUM, GERMANIUM SESQUIOXIDE, HOLMIUM METALLICUM, INDIUM METALLICUM, 3; 3; 4; 12; 12; 12; 12; 12; 12; 12; 12; 12; 12; 12; 12; 12; 12; 12; 12; 12; 12; 12; 12; 12; 12; 12; 12; 12; 12; 12; 12; 12; 12; 12; 12; 12; 12; 12; 12; 12; 12; 12; 12; 12; 12; 12; 12; 12; 12; 12; 12; 30; 30; 30; 30; 30; 30; 14; 30; 30; 30; 30; 30; 30; 30; 30; 30; 30; 30; 30; 30; 30; 30; 30; 30; 30; 30; 30; 30; 30; 30 [HP_X]/ML; [HP_X]/ML; [HP_X]/ML; [HP_X]/ML; [HP_X]/ML; [HP_X]/ML; [HP_X]/ML; [HP_X]/ML; [HP_X]/ML; [HP_X]/ML; [HP_X]/ML; [HP_X]/ML; [HP_X]/ML; [HP_X]/ML; [HP_X]/ML; [HP_X]/ML; [HP_X]/ML; [HP_X]/ML; [HP_X]/ML; [HP_X]/ML; [HP_X]/ML; [HP_X]/ML; [HP_X]/ML; [HP_X]/ML; [HP_X]/ML; [HP_X]/ML; [HP_X]/ML; [HP_X]/ML; [HP_X]/ML; [HP_X]/ML; [HP_X]/ML; [HP_X]/ML; [HP_X]/ML; [HP_X]/ML; [HP_X]/ML; [HP_X]/ML; [HP_X]/ML; [HP_X]/ML; [HP_X]/ML; [HP_X]/ML; [HP_X]/ML; [HP_X]/ML; [HP_X]/ML; [HP_X]/ML; [HP_X]/ML; [HP_X]/ML; [HP_X]/ML; [HP_X]/ML; [HP_X]/ML; [HP_X]/ML; [HP_X]/ML; [HP_X]/ML; [HP_X]/ML; [HP_X]/ML; [HP_X]/ML; [HP_X]/ML; [HP_X]/ML; [HP_C]/ML; [HP_C]/ML; [HP_C]/ML; [HP_C]/ML; [HP_C]/ML; [HP_C]/ML; [HP_C]/ML; [HP_C]/ML; [HP_C]/ML; [HP_C]/ML; [HP_C]/ML; [HP_C]/ML; [HP_C]/ML; [HP_C]/ML; [HP_C]/ML; [HP_C]/ML; [HP_C]/ML; [HP_C]/ML; [HP_C]/ML; [HP_C]/ML; [HP_C]/ML; [HP_C]/ML; [HP_C]/ML; [HP_C]/ML
LIQUID ORAL
COMMUNITY
End: 2021-04-09

## 2020-10-15 NOTE — PROGRESS NOTES
Endocrine Progress Note Outpatient     Patient Care Team:  Suzanne Harris APRN as PCP - General    Chief Complaint: Follow-up Hashimoto's    HPI: 29-year-old female with history of Hashimoto thyroiditis and vitamin D deficiency is here for follow-up.  For Hashimoto's thyroiditis: She is currently on levothyroxine 25 mcg p.o. daily.  It did help her with the energy however she feels like she is going down with regards to this energy again but not quite back to the baseline.  She also have developed a rash on her face which she had before she started levothyroxine but it has worsened since then.  She was on some antibiotics for celiac disease which she also stopped the same times at this time is not sure whether stopping the antibiotics is causing the worsening of the rash on it is the thyroid pill.    Vitamin D deficiency: Currently on vitamin D 5000 units p.o. daily    She also has a presumptive diagnosis of celiac disease and she is working with a nutritionist specialist.  With vitamin D deficiency.  She also have celiac disease.        Past Medical History:   Diagnosis Date   • Allergic    • Anemia    • Anemia    • Anxiety    • Asthma    • Celiac disease    • Depression    • Hashimoto's disease 2020   • Headache        Social History     Socioeconomic History   • Marital status:      Spouse name: Not on file   • Number of children: Not on file   • Years of education: Not on file   • Highest education level: Not on file   Tobacco Use   • Smoking status: Never Smoker   • Smokeless tobacco: Never Used   Substance and Sexual Activity   • Alcohol use: Yes     Frequency: 2-4 times a month     Drinks per session: 1 or 2     Comment: one - two glasses of wine weekly    • Drug use: No   • Sexual activity: Yes     Partners: Male       Family History   Problem Relation Age of Onset   • Diabetes Mother    • Miscarriages / Stillbirths Mother    • Breast cancer Mother 48   • Hypertension Father    • Lung cancer  Maternal Grandmother 45        62   • Breast cancer Maternal Grandfather    • Lung cancer Other    • Lung cancer Other    • Lung cancer Other    • Thyroid disease Paternal Grandmother        Allergies   Allergen Reactions   • Gluten Meal Swelling       ROS:   Constitutional:  Denies fatigue, tiredness.    Eyes:  Denies change in visual acuity   HENT:  Denies nasal congestion or sore throat   Respiratory: denies cough, shortness of breath.   Cardiovascular:  denies chest pain, edema   GI:  Denies abdominal pain, nausea, vomiting.   Musculoskeletal:  Denies back pain or joint pain   Integument:  Admit rash   Neurologic:  Denies headache, focal weakness or sensory changes   Endocrine:  Denies polyuria or polydipsia   Psychiatric:  Denies depression or anxiety      Vitals:    10/15/20 0836   BP: 125/72   Pulse: 81   Temp: 98 °F (36.7 °C)   SpO2: 98%       Physical Exam:  GEN: NAD, conversant  FACE: Has rash on face  EYES: EOMI, PERRL, no conjunctival erythema  NECK: no thyromegaly, full ROM   CV: RRR, no murmurs/rubs/gallops, no peripheral edema  LUNG: CTAB, no wheezes/rales/ronchi  SKIN: no rashes, no acanthosis  MSK: no deformities, full ROM of all extremities  NEURO: no tremors, DTR normal  PSYCH: AOX3, appropriate mood, affect normal      Results Review:     I reviewed the patient's new clinical results.    Lab Results   Component Value Date    HGBA1C 5.0 05/27/2020      Lab Results   Component Value Date    GLUCOSE 89 10/08/2020    BUN 13 10/08/2020    CREATININE 0.79 10/08/2020    EGFRIFNONA 86 10/08/2020    BCR 16.5 10/08/2020    K 3.8 10/08/2020    CO2 28.1 10/08/2020    CALCIUM 9.8 10/08/2020    ALBUMIN 4.80 10/08/2020    LABIL2 1.5 06/23/2017    AST 16 10/08/2020    ALT 11 10/08/2020    CHOL 160 06/23/2017    TRIG 66 06/23/2017    LDL 87 06/23/2017    HDL 54 06/23/2017     Lab Results   Component Value Date    TSH 2.820 10/08/2020    FREET4 1.58 10/08/2020    THYROIDAB 145 (H) 05/27/2020         Medication  Review: Reviewed.       Current Outpatient Medications:   •  CHARCOAL ACTIVATED PO, Take  by mouth., Disp: , Rfl:   •  Cholecalciferol (VITAMIN D) 125 MCG (5000 UT) capsule capsule, Take 1 capsule by mouth Daily., Disp: 30 capsule, Rfl: 3  •  Euthyrox 25 MCG tablet, Take 1 tablet by mouth once daily, Disp: 30 tablet, Rfl: 3  •  ferrous sulfate 325 (65 Fe) MG tablet, Take  by mouth., Disp: , Rfl:   •  Flax Oil-Fish Oil-Borage Oil (FISH-FLAX-BORAGE PO), Take  by mouth., Disp: , Rfl:   •  Multiple Vitamins-Iron (Chlorella) capsule, Take  by mouth., Disp: , Rfl:   •  Multiple Vitamins-Minerals (MULTIVITAMIN ADULT) tablet, , Disp: , Rfl:   •  Probiotic Product (PROBIOTIC PO), Take  by mouth., Disp: , Rfl:   •  Tolnaftate (ANTI-FUNGAL EX), Apply  topically., Disp: , Rfl:       Assessment/Plan   1.  Hashimoto's thyroiditis: Biochemically euthyroid. She has facial rash, etiology unknown. Will change to brand name synthyroid and see if it helps rash.     2.  Vitamin D deficiency: She is currently on vitamin D 5000 units p.o. daily.  We will continue that and follow levels.            Anthony Post MD FACE.

## 2020-11-09 ENCOUNTER — TELEPHONE (OUTPATIENT)
Dept: FAMILY MEDICINE CLINIC | Facility: CLINIC | Age: 30
End: 2020-11-09

## 2020-11-09 NOTE — TELEPHONE ENCOUNTER
PT SAW DR BLANDON FOR HER ACNE.RASH IN MARCH WHEN  YOU REFERRED HER. SHE WOULD NOW LIKE TO SEE DR STEINBERG. CAN YOU PLEASE ENTER ANOTHER REFERRAL?

## 2020-11-09 NOTE — TELEPHONE ENCOUNTER
Pt saw you last time you changed the thyroid med dose due to skin. She tells me that her skin is not better. Should she switch to generic or purest form is what you mention last time. She feels the generic did better for her. Please advise

## 2020-11-10 DIAGNOSIS — L70.9 ACNE, UNSPECIFIED ACNE TYPE: Primary | ICD-10-CM

## 2020-11-12 RX ORDER — LEVOTHYROXINE SODIUM 25 UG/1
25 CAPSULE ORAL DAILY
Qty: 30 CAPSULE | Refills: 2 | Status: SHIPPED | OUTPATIENT
Start: 2020-11-12 | End: 2020-11-20 | Stop reason: CLARIF

## 2020-11-12 NOTE — TELEPHONE ENCOUNTER
Yes she needs to stop previous thyroid medications and add Tirosint 25 mcg p.o. daily.  Check TSH and free T4 in 6 weeks.

## 2020-11-12 NOTE — TELEPHONE ENCOUNTER
Pt advised. I confirmed her pharmacy and have placed the order, labs rescheduled for 6 weeks instead of 3.

## 2020-11-13 ENCOUNTER — TELEPHONE (OUTPATIENT)
Dept: ENDOCRINOLOGY | Facility: CLINIC | Age: 30
End: 2020-11-13

## 2020-11-20 RX ORDER — LEVOTHYROXINE SODIUM 0.03 MG/1
25 TABLET ORAL DAILY
Qty: 90 TABLET | Refills: 2 | Status: SHIPPED | OUTPATIENT
Start: 2020-11-20 | End: 2021-10-15

## 2020-11-20 NOTE — TELEPHONE ENCOUNTER
Pt will go back to generic, Rx entered. Pt also unsure of when repeat labs need to be since she will not be switching medication. Please advise and I will call to schedule.

## 2021-01-14 ENCOUNTER — OFFICE (OUTPATIENT)
Dept: URBAN - METROPOLITAN AREA CLINIC 64 | Facility: CLINIC | Age: 31
End: 2021-01-14

## 2021-01-14 VITALS
SYSTOLIC BLOOD PRESSURE: 144 MMHG | HEIGHT: 65 IN | HEART RATE: 63 BPM | DIASTOLIC BLOOD PRESSURE: 87 MMHG | WEIGHT: 135 LBS

## 2021-01-14 DIAGNOSIS — R19.4 CHANGE IN BOWEL HABIT: ICD-10-CM

## 2021-01-14 DIAGNOSIS — R63.0 ANOREXIA: ICD-10-CM

## 2021-01-14 DIAGNOSIS — R14.0 ABDOMINAL DISTENSION (GASEOUS): ICD-10-CM

## 2021-01-14 DIAGNOSIS — R10.84 GENERALIZED ABDOMINAL PAIN: ICD-10-CM

## 2021-01-14 PROCEDURE — 99204 OFFICE O/P NEW MOD 45 MIN: CPT | Performed by: NURSE PRACTITIONER

## 2021-01-20 ENCOUNTER — LAB (OUTPATIENT)
Dept: LAB | Facility: HOSPITAL | Age: 31
End: 2021-01-20

## 2021-01-20 LAB — SARS-COV-2 ORF1AB RESP QL NAA+PROBE: NOT DETECTED

## 2021-01-20 PROCEDURE — U0004 COV-19 TEST NON-CDC HGH THRU: HCPCS

## 2021-01-20 PROCEDURE — C9803 HOPD COVID-19 SPEC COLLECT: HCPCS

## 2021-01-21 ENCOUNTER — ANESTHESIA EVENT (OUTPATIENT)
Dept: GASTROENTEROLOGY | Facility: HOSPITAL | Age: 31
End: 2021-01-21

## 2021-01-22 ENCOUNTER — ON CAMPUS - OUTPATIENT (OUTPATIENT)
Dept: URBAN - METROPOLITAN AREA HOSPITAL 85 | Facility: HOSPITAL | Age: 31
End: 2021-01-22

## 2021-01-22 ENCOUNTER — ANESTHESIA (OUTPATIENT)
Dept: GASTROENTEROLOGY | Facility: HOSPITAL | Age: 31
End: 2021-01-22

## 2021-01-22 ENCOUNTER — HOSPITAL ENCOUNTER (OUTPATIENT)
Facility: HOSPITAL | Age: 31
Setting detail: HOSPITAL OUTPATIENT SURGERY
Discharge: HOME | End: 2021-01-22
Attending: INTERNAL MEDICINE | Admitting: INTERNAL MEDICINE

## 2021-01-22 VITALS
HEART RATE: 67 BPM | HEIGHT: 65 IN | BODY MASS INDEX: 21.97 KG/M2 | RESPIRATION RATE: 18 BRPM | SYSTOLIC BLOOD PRESSURE: 115 MMHG | OXYGEN SATURATION: 97 % | DIASTOLIC BLOOD PRESSURE: 64 MMHG | WEIGHT: 131.84 LBS | TEMPERATURE: 98.2 F

## 2021-01-22 DIAGNOSIS — K63.5 POLYP OF COLON: ICD-10-CM

## 2021-01-22 DIAGNOSIS — R14.0 BLOATING: ICD-10-CM

## 2021-01-22 DIAGNOSIS — R19.4 CHANGE IN BOWEL HABIT: ICD-10-CM

## 2021-01-22 DIAGNOSIS — R14.0 ABDOMINAL DISTENSION (GASEOUS): ICD-10-CM

## 2021-01-22 DIAGNOSIS — D12.0 BENIGN NEOPLASM OF CECUM: ICD-10-CM

## 2021-01-22 DIAGNOSIS — R10.9 ABDOMINAL PAIN: ICD-10-CM

## 2021-01-22 DIAGNOSIS — R10.9 UNSPECIFIED ABDOMINAL PAIN: ICD-10-CM

## 2021-01-22 PROCEDURE — 88305 TISSUE EXAM BY PATHOLOGIST: CPT | Performed by: INTERNAL MEDICINE

## 2021-01-22 PROCEDURE — 45385 COLONOSCOPY W/LESION REMOVAL: CPT | Performed by: INTERNAL MEDICINE

## 2021-01-22 PROCEDURE — 45380 COLONOSCOPY AND BIOPSY: CPT | Mod: 59 | Performed by: INTERNAL MEDICINE

## 2021-01-22 PROCEDURE — 25010000002 PROPOFOL 10 MG/ML EMULSION: Performed by: ANESTHESIOLOGY

## 2021-01-22 PROCEDURE — 43239 EGD BIOPSY SINGLE/MULTIPLE: CPT | Performed by: INTERNAL MEDICINE

## 2021-01-22 RX ORDER — PROPOFOL 10 MG/ML
VIAL (ML) INTRAVENOUS AS NEEDED
Status: DISCONTINUED | OUTPATIENT
Start: 2021-01-22 | End: 2021-01-22 | Stop reason: SURG

## 2021-01-22 RX ORDER — SODIUM CHLORIDE 0.9 % (FLUSH) 0.9 %
10 SYRINGE (ML) INJECTION AS NEEDED
Status: DISCONTINUED | OUTPATIENT
Start: 2021-01-22 | End: 2021-01-22 | Stop reason: HOSPADM

## 2021-01-22 RX ORDER — SODIUM CHLORIDE 0.9 % (FLUSH) 0.9 %
3 SYRINGE (ML) INJECTION EVERY 12 HOURS SCHEDULED
Status: DISCONTINUED | OUTPATIENT
Start: 2021-01-22 | End: 2021-01-22 | Stop reason: HOSPADM

## 2021-01-22 RX ORDER — SODIUM CHLORIDE 9 MG/ML
9 INJECTION, SOLUTION INTRAVENOUS CONTINUOUS PRN
Status: DISCONTINUED | OUTPATIENT
Start: 2021-01-22 | End: 2021-01-22 | Stop reason: HOSPADM

## 2021-01-22 RX ORDER — MAGNESIUM CARB/ALUMINUM HYDROX 105-160MG
296 TABLET,CHEWABLE ORAL ONCE
Status: DISCONTINUED | OUTPATIENT
Start: 2021-01-22 | End: 2021-01-22 | Stop reason: HOSPADM

## 2021-01-22 RX ORDER — LIDOCAINE HYDROCHLORIDE 10 MG/ML
INJECTION, SOLUTION EPIDURAL; INFILTRATION; INTRACAUDAL; PERINEURAL AS NEEDED
Status: DISCONTINUED | OUTPATIENT
Start: 2021-01-22 | End: 2021-01-22 | Stop reason: SURG

## 2021-01-22 RX ADMIN — PROPOFOL 600 MG: 10 INJECTION, EMULSION INTRAVENOUS at 10:31

## 2021-01-22 RX ADMIN — LIDOCAINE HYDROCHLORIDE 50 MG: 10 INJECTION, SOLUTION EPIDURAL; INFILTRATION; INTRACAUDAL; PERINEURAL at 10:31

## 2021-01-22 RX ADMIN — SODIUM CHLORIDE 9 ML/HR: 9 INJECTION, SOLUTION INTRAVENOUS at 09:37

## 2021-01-22 NOTE — ANESTHESIA PREPROCEDURE EVALUATION
Anesthesia Evaluation     Patient summary reviewed and Nursing notes reviewed   NPO Solid Status: > 8 hours  NPO Liquid Status: > 8 hours           Airway   Mallampati: II  TM distance: >3 FB  Neck ROM: full  No difficulty expected  Dental - normal exam     Pulmonary - normal exam   (+) asthma,  Cardiovascular - negative cardio ROS and normal exam        Neuro/Psych  (+) headaches,     GI/Hepatic/Renal/Endo    (+)   thyroid problem     Musculoskeletal (-) negative ROS    Abdominal  - normal exam    Bowel sounds: normal.   Substance History - negative use     OB/GYN negative ob/gyn ROS         Other                        Anesthesia Plan    ASA 2     MAC     intravenous induction     Anesthetic plan, all risks, benefits, and alternatives have been provided, discussed and informed consent has been obtained with: patient.

## 2021-01-22 NOTE — OP NOTE
ESOPHAGOGASTRODUODENOSCOPY, COLONOSCOPY Procedure Report    Patient Name:  Demetra Lal  YOB: 1990    Date of Surgery:  1/22/2021     Pre-Op Diagnosis:  Abdominal pain [R10.9]  Bloating [R14.0]  Change in bowel habit [R19.4]       Postop diagnosis:  1.  Abnormal esophageal mucosa  2.:  Colon polyp    Procedure/CPT® Codes:      Procedure(s):  ESOPHAGOGASTRODUODENOSCOPY with biopsy x 3 areas  COLONOSCOPY with snare and biopsy    Staff:  Surgeon(s):  Maxi Sorensen MD      Anesthesia: Monitored Anesthesia Care    Description of Procedure:  A description of the procedure as well as risks, benefits and alternative methods were explained to the patient who voiced understanding and signed the corresponding consent form. A physical exam was performed and vital signs were monitored throughout the procedure.    An upper GI endoscope was placed into the mouth and proceeded through the esophagus, stomach and second portion of the duodenum without difficulty. The scope was then retroflexed and the fundus was visualized. The procedure was not difficult and there were no immediate complications.  There was no blood loss.    Next, A rectal exam was performed which was normal. An Olympus colonoscope was placed into the rectum and proceeded under direct visualization through the colon until the cecum and appendiceal orifice were identified. Careful visualization occurred upon slow withdraw of the scope. The scope was then retroflexed and the distal rectum was visualized. The quality of the prep was good. The procedure was not difficult and there were no immediate complications.  There was no blood loss.    Impression:  1.  Linear furrows diffusely throughout the entire esophagus suggestive of possible underlying eosinophilic esophagitis, cold forcep biopsies were taken of the midesophagus.  2.  Normal gastric mucosa entire stomach, cold forcep biopsies of the antrum and body were taken for histopathology and  H. pylori  3.  Normal duodenal mucosa visualized to D3, cold forcep biopsies were taken to rule out celiac    Colonoscopy findings:  1.  Normal terminal ileum mucosa visualized 15 cm into the TI.  2.  Normal colonic mucosa entire colon, cold forcep biopsies were randomly taken of the entire colon and sent in 1 jar.  3.  1 polyp in the cecum that was 7 mm and sessile removed via cold snare and sent for histopathology  4.  1 polyp in the transverse colon that was 3 mm and sessile removed via cold forcep biopsies and sent for histopathology.    Recommendations:  Follow biopsy results   Repeat colonoscopy in 7 years if polyps are adenomatous and 10 years of polyps are hyperplastic  May benefit from Naima Sorensen MD     Date: 1/22/2021    Time: 10:43 EST

## 2021-01-22 NOTE — H&P
GI CONSULT  NOTE:    Referring Provider:    Suzanne Harris APRN  [unfilled]    Chief complaint: <principal problem not specified>    Subjective .       Pre op diagnosis  Abdominal pain [R10.9]  Bloating [R14.0]  Change in bowel habit [R19.4]   Loss of appetite      History of present illness:      Demetra Lal is a 30 y.o. female who presents today for Procedure(s):  ESOPHAGOGASTRODUODENOSCOPY  COLONOSCOPY for the indications listed below.     The updated Patient Profile was reviewed prior to the procedure, in conjunction with the Physical Exam, including medical conditions, surgical procedures, medications, allergies, family history and social history.     Pre-operatively, I reviewed the indication(s) for the procedure, the risks of the procedure [including but not limited to: unexpected bleeding possibly requiring hospitalization and/or unplanned repeat procedures, perforation possibly requiring surgical treatment, missed lesions and complications of sedation/MAC (also explained by anesthesia staff)].     I have evaluated the patient for risks associated with the planned anesthesia and the procedure to be performed and find the patient an acceptable candidate for IV sedation.    Multiple opportunities were provided for any questions or concerns, and all questions were answered satisfactorily before any anesthesia was administered. We will proceed with the planned procedure.    Past Medical History:  Past Medical History:   Diagnosis Date   • Allergic    • Anemia    • Anemia    • Anxiety    • Asthma    • Celiac disease    • Depression    • Hashimoto's disease 2020   • Headache        Past Surgical History:  History reviewed. No pertinent surgical history.    Social History:  Social History     Tobacco Use   • Smoking status: Never Smoker   • Smokeless tobacco: Never Used   Substance Use Topics   • Alcohol use: Yes     Frequency: 2-4 times a month     Drinks per session: 1 or 2     Comment: one - two  glasses of wine weekly    • Drug use: No       Family History:  Family History   Problem Relation Age of Onset   • Diabetes Mother    • Miscarriages / Stillbirths Mother    • Breast cancer Mother 48   • Hypertension Father    • Lung cancer Maternal Grandmother 45        62   • Breast cancer Maternal Grandfather    • Lung cancer Other    • Lung cancer Other    • Lung cancer Other    • Thyroid disease Paternal Grandmother        Medications:  Medications Prior to Admission   Medication Sig Dispense Refill Last Dose   • Cholecalciferol (VITAMIN D) 125 MCG (5000 UT) capsule capsule Take 1 capsule by mouth Daily. 30 capsule 3 1/15/2021   • ferrous sulfate 325 (65 Fe) MG tablet Take  by mouth.   1/15/2021   • Flax Oil-Fish Oil-Borage Oil (FISH-FLAX-BORAGE PO) Take  by mouth. LD 1/15   1/1/2021   • levothyroxine (SYNTHROID, LEVOTHROID) 25 MCG tablet Take 1 tablet by mouth Daily. (Patient taking differently: Take 25 mcg by mouth Daily. Take DOS) 90 tablet 2 1/20/2021   • Multiple Vitamins-Iron (Chlorella) capsule Take  by mouth.   1/15/2021   • Multiple Vitamins-Minerals (MULTIVITAMIN ADULT) tablet    1/15/2021   • Probiotic Product (PROBIOTIC PO) Take  by mouth.   1/15/2021   • CHARCOAL ACTIVATED PO Take  by mouth.   More than a month at Unknown time   • Tolnaftate (ANTI-FUNGAL EX) Apply  topically.   Unknown at Unknown time       Scheduled Meds:   Continuous Infusions:sodium chloride, 9 mL/hr, Last Rate: 9 mL/hr (01/22/21 0937)      PRN Meds:.•  sodium chloride  •  sodium chloride    ALLERGIES:  Gluten meal    ROS:  The following systems were reviewed and negative;  Constitution:  No fevers, chills, no unintentional weight loss  Skin: no rash, no jaundice  Eyes:  No blurry vision, no eye pain  HENT:  No change in hearing or smell  Resp:  No dyspnea or cough  CV:  No chest pain or palpitations  :  No dysuria, hematuria  Musculoskeletal:  No leg cramps or arthralgias  Neuro:  No tremor, no numbness  Psych:  No depression  "or confsuion    Objective     Vital Signs:   Vitals:    01/15/21 1347 01/22/21 0923   BP:  126/85   BP Location:  Left arm   Patient Position:  Lying   Pulse:  81   Resp:  19   Temp:  98.2 °F (36.8 °C)   TempSrc:  Oral   SpO2:  98%   Weight: 59 kg (130 lb) 59.8 kg (131 lb 13.4 oz)   Height: 165.1 cm (65\") 165.1 cm (65\")       Physical Exam:       General Appearance:    Awake and alert, in no acute distress   Head:    Normocephalic, without obvious abnormality, atraumatic   Throat:   No oral lesions, no thrush, oral mucosa moist   Lungs:     respirations regular, even and unlabored   Skin:   No rash, no jaundice       Results Review:  Lab Results (last 24 hours)     ** No results found for the last 24 hours. **          Imaging Results (Last 24 Hours)     ** No results found for the last 24 hours. **           I reviewed the patient's labs and imaging.    ASSESSMENT AND PLAN:      Active Problems:    * No active hospital problems. *       Procedure(s):  ESOPHAGOGASTRODUODENOSCOPY  COLONOSCOPY      I discussed the patients findings and my recommendations with the patient.    Maxi Sorensen MD  01/22/21  09:48 EST              "

## 2021-01-22 NOTE — ANESTHESIA POSTPROCEDURE EVALUATION
Patient: Demetra Lal    Procedure Summary     Date: 01/22/21 Room / Location: [facility] ENDOSCOPY 1 / [facility] ENDOSCOPY    Anesthesia Start: 1030 Anesthesia Stop: 1102    Procedures:       ESOPHAGOGASTRODUODENOSCOPY with biopsy x 3 areas (N/A )      COLONOSCOPY with polypectomy x 2 and biopsy x 1 area (N/A ) Diagnosis:       Abdominal pain      Bloating      Change in bowel habit      (Abdominal pain [R10.9])      (Bloating [R14.0])      (Change in bowel habit [R19.4])    Surgeon: Maxi Sorensen MD Provider: Tu Elder MD    Anesthesia Type: MAC ASA Status: 2          Anesthesia Type: MAC    Vitals  Vitals Value Taken Time   /52 01/22/21 1112   Temp     Pulse 82 01/22/21 1112   Resp     SpO2 88 % 01/22/21 1112   Vitals shown include unvalidated device data.        Post Anesthesia Care and Evaluation    Patient location during evaluation: PACU  Patient participation: complete - patient participated  Level of consciousness: awake  Pain scale: See nurse's notes for pain score.  Pain management: adequate  Airway patency: patent  Anesthetic complications: No anesthetic complications  PONV Status: none  Cardiovascular status: acceptable  Respiratory status: acceptable  Hydration status: acceptable    Comments: Patient seen and examined postoperatively; vital signs stable; SpO2 greater than or equal to 90%; cardiopulmonary status stable; nausea/vomiting adequately controlled; pain adequately controlled; no apparent anesthesia complications; patient discharged from anesthesia care when discharge criteria were met

## 2021-01-25 LAB
LAB AP CASE REPORT: NORMAL
PATH REPORT.FINAL DX SPEC: NORMAL
PATH REPORT.GROSS SPEC: NORMAL

## 2021-02-01 ENCOUNTER — OFFICE (OUTPATIENT)
Dept: URBAN - METROPOLITAN AREA CLINIC 64 | Facility: CLINIC | Age: 31
End: 2021-02-01

## 2021-02-01 VITALS
DIASTOLIC BLOOD PRESSURE: 90 MMHG | HEART RATE: 72 BPM | WEIGHT: 135 LBS | HEIGHT: 65 IN | SYSTOLIC BLOOD PRESSURE: 135 MMHG

## 2021-02-01 DIAGNOSIS — R10.84 GENERALIZED ABDOMINAL PAIN: ICD-10-CM

## 2021-02-01 DIAGNOSIS — R14.0 ABDOMINAL DISTENSION (GASEOUS): ICD-10-CM

## 2021-02-01 PROCEDURE — 99213 OFFICE O/P EST LOW 20 MIN: CPT | Performed by: NURSE PRACTITIONER

## 2021-02-01 RX ORDER — RIFAXIMIN 550 MG/1
TABLET ORAL
Qty: 42 | Refills: 2 | Status: COMPLETED
Start: 2021-02-01 | End: 2021-04-01

## 2021-04-01 ENCOUNTER — OFFICE (OUTPATIENT)
Dept: URBAN - METROPOLITAN AREA CLINIC 64 | Facility: CLINIC | Age: 31
End: 2021-04-01

## 2021-04-01 VITALS
HEART RATE: 89 BPM | HEIGHT: 65 IN | DIASTOLIC BLOOD PRESSURE: 72 MMHG | WEIGHT: 133 LBS | SYSTOLIC BLOOD PRESSURE: 113 MMHG

## 2021-04-01 DIAGNOSIS — R63.0 ANOREXIA: ICD-10-CM

## 2021-04-01 DIAGNOSIS — R10.84 GENERALIZED ABDOMINAL PAIN: ICD-10-CM

## 2021-04-01 DIAGNOSIS — R19.4 CHANGE IN BOWEL HABIT: ICD-10-CM

## 2021-04-01 DIAGNOSIS — R14.0 ABDOMINAL DISTENSION (GASEOUS): ICD-10-CM

## 2021-04-01 PROCEDURE — 99213 OFFICE O/P EST LOW 20 MIN: CPT | Performed by: NURSE PRACTITIONER

## 2021-04-02 ENCOUNTER — LAB (OUTPATIENT)
Dept: LAB | Facility: HOSPITAL | Age: 31
End: 2021-04-02

## 2021-04-02 DIAGNOSIS — E06.3 HASHIMOTO'S THYROIDITIS: ICD-10-CM

## 2021-04-02 DIAGNOSIS — E55.9 VITAMIN D DEFICIENCY: ICD-10-CM

## 2021-04-02 LAB
25(OH)D3 SERPL-MCNC: 59.6 NG/ML
ALBUMIN SERPL-MCNC: 4.3 G/DL (ref 3.5–5.2)
ALBUMIN/GLOB SERPL: 1.9 G/DL
ALP SERPL-CCNC: 40 U/L (ref 39–117)
ALT SERPL W P-5'-P-CCNC: 12 U/L (ref 1–33)
ANION GAP SERPL CALCULATED.3IONS-SCNC: 8.3 MMOL/L (ref 5–15)
AST SERPL-CCNC: 17 U/L (ref 1–32)
BILIRUB SERPL-MCNC: 0.3 MG/DL (ref 0–1.2)
BUN SERPL-MCNC: 9 MG/DL (ref 6–20)
BUN/CREAT SERPL: 12.2 (ref 7–25)
CALCIUM SPEC-SCNC: 8.9 MG/DL (ref 8.6–10.5)
CHLORIDE SERPL-SCNC: 104 MMOL/L (ref 98–107)
CO2 SERPL-SCNC: 27.7 MMOL/L (ref 22–29)
CREAT SERPL-MCNC: 0.74 MG/DL (ref 0.57–1)
GFR SERPL CREATININE-BSD FRML MDRD: 92 ML/MIN/1.73
GLOBULIN UR ELPH-MCNC: 2.3 GM/DL
GLUCOSE SERPL-MCNC: 95 MG/DL (ref 65–99)
POTASSIUM SERPL-SCNC: 4.1 MMOL/L (ref 3.5–5.2)
PROT SERPL-MCNC: 6.6 G/DL (ref 6–8.5)
SODIUM SERPL-SCNC: 140 MMOL/L (ref 136–145)
T4 FREE SERPL-MCNC: 1.28 NG/DL (ref 0.93–1.7)
TSH SERPL DL<=0.05 MIU/L-ACNC: 2.26 UIU/ML (ref 0.27–4.2)

## 2021-04-02 PROCEDURE — 36415 COLL VENOUS BLD VENIPUNCTURE: CPT

## 2021-04-02 PROCEDURE — 84443 ASSAY THYROID STIM HORMONE: CPT

## 2021-04-02 PROCEDURE — 84439 ASSAY OF FREE THYROXINE: CPT

## 2021-04-02 PROCEDURE — 82306 VITAMIN D 25 HYDROXY: CPT

## 2021-04-02 PROCEDURE — 80053 COMPREHEN METABOLIC PANEL: CPT

## 2021-04-09 ENCOUNTER — OFFICE VISIT (OUTPATIENT)
Dept: ENDOCRINOLOGY | Facility: CLINIC | Age: 31
End: 2021-04-09

## 2021-04-09 ENCOUNTER — LAB (OUTPATIENT)
Dept: LAB | Facility: HOSPITAL | Age: 31
End: 2021-04-09

## 2021-04-09 VITALS
HEIGHT: 65 IN | BODY MASS INDEX: 22.16 KG/M2 | DIASTOLIC BLOOD PRESSURE: 65 MMHG | OXYGEN SATURATION: 98 % | HEART RATE: 67 BPM | TEMPERATURE: 98 F | WEIGHT: 133 LBS | SYSTOLIC BLOOD PRESSURE: 112 MMHG

## 2021-04-09 DIAGNOSIS — E06.3 HASHIMOTO'S THYROIDITIS: ICD-10-CM

## 2021-04-09 DIAGNOSIS — E55.9 VITAMIN D DEFICIENCY: ICD-10-CM

## 2021-04-09 DIAGNOSIS — L70.8 OTHER ACNE: ICD-10-CM

## 2021-04-09 DIAGNOSIS — E06.3 HASHIMOTO'S THYROIDITIS: Primary | ICD-10-CM

## 2021-04-09 LAB
ALBUMIN SERPL-MCNC: 4.9 G/DL (ref 3.5–5.2)
ALBUMIN/GLOB SERPL: 1.9 G/DL
ALP SERPL-CCNC: 38 U/L (ref 39–117)
ALT SERPL W P-5'-P-CCNC: 18 U/L (ref 1–33)
ANION GAP SERPL CALCULATED.3IONS-SCNC: 8.4 MMOL/L (ref 5–15)
AST SERPL-CCNC: 19 U/L (ref 1–32)
BILIRUB SERPL-MCNC: 0.4 MG/DL (ref 0–1.2)
BUN SERPL-MCNC: 23 MG/DL (ref 6–20)
BUN/CREAT SERPL: 30.7 (ref 7–25)
CALCIUM SPEC-SCNC: 9.4 MG/DL (ref 8.6–10.5)
CHLORIDE SERPL-SCNC: 102 MMOL/L (ref 98–107)
CO2 SERPL-SCNC: 26.6 MMOL/L (ref 22–29)
CREAT SERPL-MCNC: 0.75 MG/DL (ref 0.57–1)
FSH SERPL-ACNC: 2.33 MIU/ML
GFR SERPL CREATININE-BSD FRML MDRD: 91 ML/MIN/1.73
GLOBULIN UR ELPH-MCNC: 2.6 GM/DL
GLUCOSE SERPL-MCNC: 85 MG/DL (ref 65–99)
LH SERPL-ACNC: 6.29 MIU/ML
POTASSIUM SERPL-SCNC: 3.9 MMOL/L (ref 3.5–5.2)
PROT SERPL-MCNC: 7.5 G/DL (ref 6–8.5)
SODIUM SERPL-SCNC: 137 MMOL/L (ref 136–145)
T4 FREE SERPL-MCNC: 1.37 NG/DL (ref 0.93–1.7)
TSH SERPL DL<=0.05 MIU/L-ACNC: 1.67 UIU/ML (ref 0.27–4.2)

## 2021-04-09 PROCEDURE — 83002 ASSAY OF GONADOTROPIN (LH): CPT

## 2021-04-09 PROCEDURE — 83001 ASSAY OF GONADOTROPIN (FSH): CPT

## 2021-04-09 PROCEDURE — 36415 COLL VENOUS BLD VENIPUNCTURE: CPT

## 2021-04-09 PROCEDURE — 99214 OFFICE O/P EST MOD 30 MIN: CPT | Performed by: INTERNAL MEDICINE

## 2021-04-09 PROCEDURE — 80053 COMPREHEN METABOLIC PANEL: CPT

## 2021-04-09 PROCEDURE — 84443 ASSAY THYROID STIM HORMONE: CPT

## 2021-04-09 PROCEDURE — 84403 ASSAY OF TOTAL TESTOSTERONE: CPT

## 2021-04-09 PROCEDURE — 84439 ASSAY OF FREE THYROXINE: CPT

## 2021-04-09 PROCEDURE — 84402 ASSAY OF FREE TESTOSTERONE: CPT

## 2021-04-09 NOTE — PROGRESS NOTES
Endocrine Progress Note Outpatient     Patient Care Team:  Suzanne Harris APRN as PCP - General    Chief Complaint: Follow-up Hashimoto's    HPI: 30-year-old female with history of Hashimoto thyroiditis and vitamin D deficiency is here for follow-up.    For Hashimoto's thyroiditis: Currently on levothyroxine 25 mcg p.o. daily and her thyroid levels are normal.  We will continue that.    Vitamin D deficiency: Currently on vitamin D 5000 units p.o. daily.    Acne: She also have irregular menstrual cycles, she could have underlying PCOS.  We will check total free testosterone with LH and FSH.  She has appointment with her OB next week.  She could benefit from birth control pill and I have advised her to discuss that with her OB.  We also could use Aldactone to help her acne and she tells me her  has vasectomy.    She also has a presumptive diagnosis of celiac disease and she is working with a nutritionist specialist.  With vitamin D deficiency.  She also have celiac disease.        Past Medical History:   Diagnosis Date   • Allergic    • Anemia    • Anemia    • Anxiety    • Asthma    • Celiac disease    • Colon polyp    • Depression    • Hashimoto's disease 2020   • Headache        Social History     Socioeconomic History   • Marital status:      Spouse name: Not on file   • Number of children: Not on file   • Years of education: Not on file   • Highest education level: Not on file   Tobacco Use   • Smoking status: Never Smoker   • Smokeless tobacco: Never Used   Vaping Use   • Vaping Use: Never used   Substance and Sexual Activity   • Alcohol use: Yes     Comment: one - two glasses of wine weekly    • Drug use: No   • Sexual activity: Yes     Partners: Male       Family History   Problem Relation Age of Onset   • Diabetes Mother    • Miscarriages / Stillbirths Mother    • Breast cancer Mother 48   • Hypertension Father    • Colon polyps Father    • Lung cancer Maternal Grandmother 45        62   •  Breast cancer Maternal Grandfather    • Lung cancer Other    • Lung cancer Other    • Lung cancer Other    • Thyroid disease Paternal Grandmother        Allergies   Allergen Reactions   • Gluten Meal Swelling       ROS:   Constitutional:  Admit fatigue, tiredness.    Eyes:  Denies change in visual acuity   HENT:  Denies nasal congestion or sore throat   Respiratory: denies cough, shortness of breath.   Cardiovascular:  denies chest pain, edema   GI:  Denies abdominal pain, nausea, vomiting.   Musculoskeletal:  Denies back pain or joint pain   Integument:  Admit acne.    Neurologic:  Denies headache, focal weakness or sensory changes   Endocrine:  Denies polyuria or polydipsia   Psychiatric:  Denies depression or anxiety      Vitals:    04/09/21 1010   BP: 112/65   Pulse: 67   Temp: 98 °F (36.7 °C)   SpO2: 98%       Physical Exam:  GEN: NAD, conversant  FACE: Has rash on face  EYES: EOMI, PERRL, no conjunctival erythema  NECK: no thyromegaly, full ROM   CV: RRR, no murmurs/rubs/gallops, no peripheral edema  LUNG: CTAB, no wheezes/rales/ronchi  SKIN: no rashes, no acanthosis  MSK: no deformities, full ROM of all extremities  NEURO: no tremors, DTR normal  PSYCH: AOX3, appropriate mood, affect normal      Results Review:     I reviewed the patient's new clinical results.    Lab Results   Component Value Date    HGBA1C 5.0 05/27/2020      Lab Results   Component Value Date    GLUCOSE 95 04/02/2021    BUN 9 04/02/2021    CREATININE 0.74 04/02/2021    EGFRIFNONA 92 04/02/2021    BCR 12.2 04/02/2021    K 4.1 04/02/2021    CO2 27.7 04/02/2021    CALCIUM 8.9 04/02/2021    ALBUMIN 4.30 04/02/2021    LABIL2 1.5 06/23/2017    AST 17 04/02/2021    ALT 12 04/02/2021    CHOL 160 06/23/2017    TRIG 66 06/23/2017    LDL 87 06/23/2017    HDL 54 06/23/2017     Lab Results   Component Value Date    TSH 2.260 04/02/2021    FREET4 1.28 04/02/2021    THYROIDAB 145 (H) 05/27/2020         Medication Review: Reviewed.       Current  Outpatient Medications:   •  B Complex Vitamins (VITAMIN B COMPLEX PO), Take  by mouth., Disp: , Rfl:   •  Cholecalciferol (VITAMIN D) 125 MCG (5000 UT) capsule capsule, Take 1 capsule by mouth Daily., Disp: 30 capsule, Rfl: 3  •  ferrous sulfate 325 (65 Fe) MG tablet, Take  by mouth., Disp: , Rfl:   •  Flax Oil-Fish Oil-Borage Oil (FISH-FLAX-BORAGE PO), Take  by mouth. LD 1/15, Disp: , Rfl:   •  levothyroxine (SYNTHROID, LEVOTHROID) 25 MCG tablet, Take 1 tablet by mouth Daily. (Patient taking differently: Take 25 mcg by mouth Daily. Take DOS), Disp: 90 tablet, Rfl: 2  •  Multiple Vitamins-Minerals (MULTIVITAMIN ADULT) tablet, , Disp: , Rfl:   •  Probiotic Product (PROBIOTIC PO), Take  by mouth., Disp: , Rfl:       Assessment/Plan   1.  Hashimoto's thyroiditis: Biochemically euthyroid. CPM.      2.  Vitamin D deficiency: She is currently on vitamin D 5000 units p.o. daily.  We will continue that and follow levels.    3.  Acne: She also have irregular menstrual cycles, she could have underlying PCOS.  We will check total free testosterone with LH and FSH.  She has appointment with her OB next week.  She could benefit from birth control pill and I have advised her to discuss that with her OB.  We also could use Aldactone to help her acne and she tells me her  has vasectomy.            Anthony Post MD FACE.

## 2021-04-13 LAB
TESTOST FREE SERPL-MCNC: 2.1 PG/ML (ref 0–4.2)
TESTOST SERPL-MCNC: 10 NG/DL (ref 8–48)

## 2021-05-28 ENCOUNTER — TELEPHONE (OUTPATIENT)
Dept: ENDOCRINOLOGY | Facility: CLINIC | Age: 31
End: 2021-05-28

## 2021-05-28 NOTE — TELEPHONE ENCOUNTER
Pt received test results from [email or web address] that she is concerned about, I was told they were scanned in, but they do not appear to be. Please advise. Pt also dropped off a copy last week.      [facility] Lab  SPECIMEN INFORMATION  SPECIMEN: [id number]  REQUISITION: [id number]  Lab ref no:  COLLECTED: 2021 08:00AM PDT  RECEIVED: 2021 12:17PM PDT  REPORTED: 2021 07:42PM PDT  PATIENT INFORMATION  Demetra Lal  : 1990  AGE: 30  GENDER: Female  FASTING: Unknown  Clinical Info:  Test Name Result Flag Reference Range Lab  FSH (Blood Spot)  FSH (Blood Spot) 9.3 NORMAL 2.4-9.3 U/L  Free T3 (Blood Spot)  Free T3 (Blood Spot) 3.1 NORMAL 2.4-4.2 pg/mL  Free T4 (Blood Spot)  Free T4 (Blood Spot) 2.4 NORMAL 0.7-2.5 ng/dL  LH (Blood Spot)  LH (Blood Spot) 7.1 NORMAL 1.6-9.3 U/L  TPOab (Blood Spot)  TPOab (Blood Spot) 84 NORMAL 0-150 IU/mL  TSH (Blood Spot)  TSH (Blood Spot) 0.9 NORMAL 0.5-3 µU/mL  Ratio: Pg/E2  Ratio: Pg/E2 58 -500  Cortisol (Saliva)  Cortisol (Saliva) 3.4 LOW 3.7-9.5 ng/mL  Cortisol (Saliva) 4.7 HIGH 1.2-3 ng/mL  Cortisol (Saliva) 2.9 HIGH 0.6-1.9 ng/mL  Cortisol (Saliva) 0.3 LOW 0.4-1 ng/mL  DHEAS (Saliva)  DHEAS (Saliva) 6.5 NORMAL 2-23 ng/mL  Estradiol (Saliva)  Estradiol (Saliva) 1.6 NORMAL 1.3-3.3 pg/mL  Progesterone (Saliva)  Progesterone (Saliva) 93 NORMAL  pg/mL  Testosterone (Saliva)  Testosterone (Saliva) 27 NORMAL 16-55 pg/mL

## 2021-05-28 NOTE — TELEPHONE ENCOUNTER
I am not sure who ordered these labs.  I cannot comment on saliva tests as we do not order them.  Rest of the labs looks normal to me.   20-Sep-2018 22:11

## 2021-06-02 ENCOUNTER — OFFICE (OUTPATIENT)
Dept: URBAN - METROPOLITAN AREA CLINIC 64 | Facility: CLINIC | Age: 31
End: 2021-06-02

## 2021-06-02 VITALS
SYSTOLIC BLOOD PRESSURE: 127 MMHG | DIASTOLIC BLOOD PRESSURE: 81 MMHG | HEART RATE: 85 BPM | HEIGHT: 65 IN | WEIGHT: 135 LBS

## 2021-06-02 DIAGNOSIS — T78.1XXA OTHER ADVERSE FOOD REACTIONS, NOT ELSEWHERE CLASSIFIED, INIT: ICD-10-CM

## 2021-06-02 DIAGNOSIS — R14.0 ABDOMINAL DISTENSION (GASEOUS): ICD-10-CM

## 2021-06-02 DIAGNOSIS — R19.4 CHANGE IN BOWEL HABIT: ICD-10-CM

## 2021-06-02 PROCEDURE — 99212 OFFICE O/P EST SF 10 MIN: CPT | Performed by: NURSE PRACTITIONER

## 2021-07-08 ENCOUNTER — OFFICE VISIT (OUTPATIENT)
Dept: FAMILY MEDICINE CLINIC | Facility: CLINIC | Age: 31
End: 2021-07-08

## 2021-07-08 VITALS
OXYGEN SATURATION: 98 % | DIASTOLIC BLOOD PRESSURE: 84 MMHG | SYSTOLIC BLOOD PRESSURE: 126 MMHG | BODY MASS INDEX: 21.97 KG/M2 | HEART RATE: 63 BPM | WEIGHT: 132 LBS | TEMPERATURE: 97.5 F

## 2021-07-08 DIAGNOSIS — R22.0 SUBMANDIBULAR SWELLING: Primary | ICD-10-CM

## 2021-07-08 DIAGNOSIS — R22.1 SUBMANDIBULAR SWELLING: Primary | ICD-10-CM

## 2021-07-08 PROCEDURE — 99213 OFFICE O/P EST LOW 20 MIN: CPT | Performed by: PHYSICIAN ASSISTANT

## 2021-07-08 RX ORDER — DROSPIRENONE AND ETHINYL ESTRADIOL TABLETS 0.02-3(28)
KIT ORAL
COMMUNITY
Start: 2021-06-16 | End: 2022-05-03

## 2021-07-08 NOTE — PROGRESS NOTES
Subjective   Demetra Lal is a 30 y.o. female.     Pt noticed increased fullness in submandibular area recently.  She then noticed increased in swelling and tenderness with a lump under chin about 1.5 weeks ago.  She denies any fevers.  She does have acne and allergies but no change in usual issues.    She has hashimoto's and saw endocrine for this.  She did mention that she felt this fullness in her upper neck but was told not to worry about it. She did go to the dentist recently and does have a cavity that she is going to be getting filled.  She is concerned about  Body fat percentage increases despite rigorous training and clean diet.       The following portions of the patient's history were reviewed and updated as appropriate: allergies, current medications, past family history, past medical history, past social history, past surgical history and problem list.  Past Medical History:   Diagnosis Date   • Allergic    • Anemia    • Anemia    • Anxiety    • Asthma    • Celiac disease    • Colon polyp    • Depression    • Hashimoto's disease 2020   • Headache      Past Surgical History:   Procedure Laterality Date   • COLONOSCOPY N/A 1/22/2021    Procedure: COLONOSCOPY with polypectomy x 2 and biopsy x 1 area;  Surgeon: Maxi Sorensen MD;  Location: [facility] ENDOSCOPY;  Service: Gastroenterology;  Laterality: N/A;  post op: polyps   • ENDOSCOPY N/A 1/22/2021    Procedure: ESOPHAGOGASTRODUODENOSCOPY with biopsy x 3 areas;  Surgeon: Maxi Sorensen MD;  Location: [facility] ENDOSCOPY;  Service: Gastroenterology;  Laterality: N/A;  post op: abnormal esophageal mucosa     Family History   Problem Relation Age of Onset   • Diabetes Mother    • Miscarriages / Stillbirths Mother    • Breast cancer Mother 48   • Hypertension Father    • Colon polyps Father    • Lung cancer Maternal Grandmother 45        62   • Breast cancer Maternal Grandfather    • Lung cancer Other    • Lung cancer Other    • Lung cancer Other    •  Thyroid disease Paternal Grandmother      Social History     Socioeconomic History   • Marital status:      Spouse name: Not on file   • Number of children: Not on file   • Years of education: Not on file   • Highest education level: Not on file   Tobacco Use   • Smoking status: Never Smoker   • Smokeless tobacco: Never Used   Vaping Use   • Vaping Use: Never used   Substance and Sexual Activity   • Alcohol use: Yes     Comment: one - two glasses of wine weekly    • Drug use: No   • Sexual activity: Yes     Partners: Male         Current Outpatient Medications:   •  B Complex Vitamins (VITAMIN B COMPLEX PO), Take  by mouth., Disp: , Rfl:   •  Cholecalciferol (VITAMIN D) 125 MCG (5000 UT) capsule capsule, Take 1 capsule by mouth Daily., Disp: 30 capsule, Rfl: 3  •  ferrous sulfate 325 (65 Fe) MG tablet, Take  by mouth., Disp: , Rfl:   •  Flax Oil-Fish Oil-Borage Oil (FISH-FLAX-BORAGE PO), Take  by mouth. LD 1/15, Disp: , Rfl:   •  levothyroxine (SYNTHROID, LEVOTHROID) 25 MCG tablet, Take 1 tablet by mouth Daily. (Patient taking differently: Take 25 mcg by mouth Daily. Take DOS), Disp: 90 tablet, Rfl: 2  •  Loryna 3-0.02 MG per tablet, , Disp: , Rfl:   •  Multiple Vitamins-Minerals (MULTIVITAMIN ADULT) tablet, , Disp: , Rfl:   •  Probiotic Product (PROBIOTIC PO), Take  by mouth., Disp: , Rfl:     Review of Systems   Constitutional: Positive for fatigue and unexpected weight gain. Negative for activity change, appetite change, chills, diaphoresis, fever and unexpected weight loss.   HENT: Positive for dental problem and swollen glands. Negative for mouth sores, postnasal drip, sore throat and trouble swallowing.    Respiratory: Negative for shortness of breath.    Musculoskeletal: Negative for neck pain and neck stiffness.   Skin: Negative for rash.     /84 (BP Location: Left arm, Patient Position: Sitting, Cuff Size: Adult)   Pulse 63   Temp 97.5 °F (36.4 °C) (Skin)   Wt 59.9 kg (132 lb)   SpO2 98%    BMI 21.97 kg/m²       Objective   Physical Exam  Vitals and nursing note reviewed.   Constitutional:       Appearance: Normal appearance.   HENT:      Head:      Jaw: There is normal jaw occlusion.      Right Ear: Tympanic membrane, ear canal and external ear normal.      Left Ear: Tympanic membrane, ear canal and external ear normal.      Nose: Nose normal.      Mouth/Throat:      Lips: Pink.      Mouth: Mucous membranes are moist.      Pharynx: Oropharynx is clear.   Neck:      Trachea: Trachea normal.      Comments: Slight fullness felt in area of submental lymph node.    Musculoskeletal:      Cervical back: Normal range of motion and neck supple. No rigidity. Normal range of motion.   Skin:     General: Skin is warm and dry.      Findings: Acne present.   Neurological:      Mental Status: She is alert and oriented to person, place, and time.   Psychiatric:         Mood and Affect: Mood normal.         Behavior: Behavior normal.         Procedures     Assessment/Plan   Diagnoses and all orders for this visit:    1. Submandibular swelling (Primary)  -     US Head Neck Soft Tissue; Future    Pt to get ultrasound of area to ensure nothing concerning. Will call with results.  Follow up as needed.  Also gave her information for an integrative medicine doctor to look into her fatigue and gut issues more.

## 2021-10-15 RX ORDER — LEVOTHYROXINE SODIUM 25 UG/1
TABLET ORAL
Qty: 30 TABLET | Refills: 11 | Status: SHIPPED | OUTPATIENT
Start: 2021-10-15 | End: 2022-10-28 | Stop reason: SDUPTHER

## 2022-05-03 NOTE — PROGRESS NOTES
"Subjective   Demetra Lal is a 31 y.o. female.       [facility]   Pt is here today for routine physical exam.   Medical/social/family hx reviewed.   Last pap 2/2020.  Scheduled with GYN for follow up next month.   Immunizations reviewed.   Non-smoker.   Works as a ; stays very active; eats healthy.   Moving to [place], [place] soon.        She is currently on levothyroxine 25 mcg daily for hypothyroidism. Stopped med in Jan for one month; felt good for awhile then \"crashed\".   Has been taking every other day since mid Feb.      Concerned about weight gain and fatigue.   Has gained 15 pounds since July 2021 but had noticed the weight gain even after starting the thyroid medication.     Using Herbalance progesterone cream from integrative med provider.  6 months.       The following portions of the patient's history were reviewed and updated as appropriate: allergies, current medications, past family history, past medical history, past social history, past surgical history and problem list.    Review of Systems   Constitutional: Positive for fatigue and unexpected weight gain. Negative for activity change, appetite change, chills, diaphoresis, fever and unexpected weight loss.   Eyes: Negative for blurred vision and visual disturbance.   Respiratory: Negative for cough, chest tightness, shortness of breath and wheezing.    Cardiovascular: Negative for chest pain and palpitations.   Gastrointestinal: Negative for abdominal pain, blood in stool, constipation, diarrhea, nausea, vomiting and indigestion.   Endocrine: Negative for polydipsia, polyphagia and polyuria.   Genitourinary: Negative for decreased urine volume, difficulty urinating, dysuria, flank pain, frequency, hematuria, menstrual problem and urgency.   Musculoskeletal: Negative for arthralgias, back pain and myalgias.   Neurological: Negative for dizziness, weakness and headache.   Psychiatric/Behavioral: Negative for depressed mood. The patient is not " nervous/anxious.        Objective   Physical Exam  Vitals reviewed.   Constitutional:       General: She is not in acute distress.     Appearance: Normal appearance.   HENT:      Head: Normocephalic and atraumatic.   Neck:      Thyroid: No thyromegaly.   Cardiovascular:      Rate and Rhythm: Normal rate and regular rhythm.      Pulses: Normal pulses.      Heart sounds: Normal heart sounds. No murmur heard.  Pulmonary:      Effort: Pulmonary effort is normal. No respiratory distress.      Breath sounds: Normal breath sounds. No wheezing or rhonchi.   Chest:      Chest wall: No tenderness.   Abdominal:      General: Bowel sounds are normal. There is no distension.      Palpations: Abdomen is soft.      Tenderness: There is no abdominal tenderness. There is no right CVA tenderness or left CVA tenderness.   Musculoskeletal:      Cervical back: Normal range of motion and neck supple. No tenderness.      Right lower leg: No edema.      Left lower leg: No edema.   Skin:     General: Skin is warm and dry.      Findings: No erythema.   Neurological:      General: No focal deficit present.      Mental Status: She is alert and oriented to person, place, and time.   Psychiatric:         Mood and Affect: Mood normal.           Assessment/Plan   Diagnoses and all orders for this visit:    1. Health maintenance examination (Primary)  Comments:  Medical/social/family hx reviewed.   Labs ordered.   Pap UTD.   Immunizations UTD.   Orders:  -     Comprehensive metabolic panel; Future  -     Lipid panel; Future  -     Hepatitis C antibody; Future    2. Hypothyroidism (acquired)  Comments:  Labs ordered.   Cont. current medication for now.     Orders:  -     Thyroid Panel With TSH  -     Thyroid Peroxidase Antibody; Future    3. Fatigue, unspecified type  -     Iron and TIBC; Future  -     CBC w AUTO Differential; Future    4. Weight gain  Comments:  Labs ordered.   Orders:  -     Iron and TIBC; Future  -     CBC w AUTO Differential;  Future    5. Iron deficiency anemia, unspecified iron deficiency anemia type  Comments:  Labs ordered.   Orders:  -     Iron and TIBC; Future  -     CBC w AUTO Differential; Future

## 2022-05-06 ENCOUNTER — OFFICE VISIT (OUTPATIENT)
Dept: FAMILY MEDICINE CLINIC | Facility: CLINIC | Age: 32
End: 2022-05-06

## 2022-05-06 ENCOUNTER — LAB (OUTPATIENT)
Dept: FAMILY MEDICINE CLINIC | Facility: CLINIC | Age: 32
End: 2022-05-06

## 2022-05-06 VITALS
SYSTOLIC BLOOD PRESSURE: 139 MMHG | DIASTOLIC BLOOD PRESSURE: 84 MMHG | WEIGHT: 147 LBS | HEIGHT: 65 IN | HEART RATE: 73 BPM | BODY MASS INDEX: 24.49 KG/M2 | OXYGEN SATURATION: 99 %

## 2022-05-06 DIAGNOSIS — Z00.00 HEALTH MAINTENANCE EXAMINATION: Primary | ICD-10-CM

## 2022-05-06 DIAGNOSIS — Z00.00 HEALTH MAINTENANCE EXAMINATION: ICD-10-CM

## 2022-05-06 DIAGNOSIS — R63.5 WEIGHT GAIN: ICD-10-CM

## 2022-05-06 DIAGNOSIS — E03.9 HYPOTHYROIDISM (ACQUIRED): ICD-10-CM

## 2022-05-06 DIAGNOSIS — R53.83 FATIGUE, UNSPECIFIED TYPE: ICD-10-CM

## 2022-05-06 DIAGNOSIS — D50.9 IRON DEFICIENCY ANEMIA, UNSPECIFIED IRON DEFICIENCY ANEMIA TYPE: ICD-10-CM

## 2022-05-06 LAB
ALBUMIN SERPL-MCNC: 4.9 G/DL (ref 3.5–5.2)
ALBUMIN/GLOB SERPL: 1.8 G/DL
ALP SERPL-CCNC: 35 U/L (ref 39–117)
ALT SERPL W P-5'-P-CCNC: 26 U/L (ref 1–33)
ANION GAP SERPL CALCULATED.3IONS-SCNC: 13 MMOL/L (ref 5–15)
AST SERPL-CCNC: 23 U/L (ref 1–32)
BASOPHILS # BLD AUTO: 0.04 10*3/MM3 (ref 0–0.2)
BASOPHILS NFR BLD AUTO: 0.8 % (ref 0–1.5)
BILIRUB SERPL-MCNC: 0.3 MG/DL (ref 0–1.2)
BUN SERPL-MCNC: 13 MG/DL (ref 6–20)
BUN/CREAT SERPL: 15.7 (ref 7–25)
CALCIUM SPEC-SCNC: 9.8 MG/DL (ref 8.6–10.5)
CHLORIDE SERPL-SCNC: 102 MMOL/L (ref 98–107)
CHOLEST SERPL-MCNC: 128 MG/DL (ref 0–200)
CO2 SERPL-SCNC: 23 MMOL/L (ref 22–29)
CREAT SERPL-MCNC: 0.83 MG/DL (ref 0.57–1)
DEPRECATED RDW RBC AUTO: 39.5 FL (ref 37–54)
EGFRCR SERPLBLD CKD-EPI 2021: 96.8 ML/MIN/1.73
EOSINOPHIL # BLD AUTO: 0.18 10*3/MM3 (ref 0–0.4)
EOSINOPHIL NFR BLD AUTO: 3.4 % (ref 0.3–6.2)
ERYTHROCYTE [DISTWIDTH] IN BLOOD BY AUTOMATED COUNT: 12.3 % (ref 12.3–15.4)
GLOBULIN UR ELPH-MCNC: 2.8 GM/DL
GLUCOSE SERPL-MCNC: 97 MG/DL (ref 65–99)
HCT VFR BLD AUTO: 40.4 % (ref 34–46.6)
HCV AB SER DONR QL: NORMAL
HDLC SERPL-MCNC: 46 MG/DL (ref 40–60)
HGB BLD-MCNC: 13.4 G/DL (ref 12–15.9)
IMM GRANULOCYTES # BLD AUTO: 0.01 10*3/MM3 (ref 0–0.05)
IMM GRANULOCYTES NFR BLD AUTO: 0.2 % (ref 0–0.5)
IRON 24H UR-MRATE: 70 MCG/DL (ref 37–145)
IRON SATN MFR SERPL: 17 % (ref 20–50)
LDLC SERPL CALC-MCNC: 69 MG/DL (ref 0–100)
LDLC/HDLC SERPL: 1.51 {RATIO}
LYMPHOCYTES # BLD AUTO: 1.68 10*3/MM3 (ref 0.7–3.1)
LYMPHOCYTES NFR BLD AUTO: 32.2 % (ref 19.6–45.3)
MCH RBC QN AUTO: 29.2 PG (ref 26.6–33)
MCHC RBC AUTO-ENTMCNC: 33.2 G/DL (ref 31.5–35.7)
MCV RBC AUTO: 88 FL (ref 79–97)
MONOCYTES # BLD AUTO: 0.47 10*3/MM3 (ref 0.1–0.9)
MONOCYTES NFR BLD AUTO: 9 % (ref 5–12)
NEUTROPHILS NFR BLD AUTO: 2.84 10*3/MM3 (ref 1.7–7)
NEUTROPHILS NFR BLD AUTO: 54.4 % (ref 42.7–76)
NRBC BLD AUTO-RTO: 0 /100 WBC (ref 0–0.2)
PLATELET # BLD AUTO: 209 10*3/MM3 (ref 140–450)
PMV BLD AUTO: 10.4 FL (ref 6–12)
POTASSIUM SERPL-SCNC: 4.2 MMOL/L (ref 3.5–5.2)
PROT SERPL-MCNC: 7.7 G/DL (ref 6–8.5)
RBC # BLD AUTO: 4.59 10*6/MM3 (ref 3.77–5.28)
SODIUM SERPL-SCNC: 138 MMOL/L (ref 136–145)
T-UPTAKE NFR SERPL: 0.92 TBI (ref 0.8–1.3)
T4 SERPL-MCNC: 7.44 MCG/DL (ref 4.5–11.7)
TIBC SERPL-MCNC: 419 MCG/DL (ref 298–536)
TRANSFERRIN SERPL-MCNC: 281 MG/DL (ref 200–360)
TRIGL SERPL-MCNC: 63 MG/DL (ref 0–150)
TSH SERPL DL<=0.05 MIU/L-ACNC: 2.2 UIU/ML (ref 0.27–4.2)
VLDLC SERPL-MCNC: 13 MG/DL (ref 5–40)
WBC NRBC COR # BLD: 5.22 10*3/MM3 (ref 3.4–10.8)

## 2022-05-06 PROCEDURE — 80061 LIPID PANEL: CPT | Performed by: NURSE PRACTITIONER

## 2022-05-06 PROCEDURE — 36415 COLL VENOUS BLD VENIPUNCTURE: CPT

## 2022-05-06 PROCEDURE — 99395 PREV VISIT EST AGE 18-39: CPT | Performed by: NURSE PRACTITIONER

## 2022-05-06 PROCEDURE — 84479 ASSAY OF THYROID (T3 OR T4): CPT | Performed by: NURSE PRACTITIONER

## 2022-05-06 PROCEDURE — 80050 GENERAL HEALTH PANEL: CPT | Performed by: NURSE PRACTITIONER

## 2022-05-06 PROCEDURE — 84436 ASSAY OF TOTAL THYROXINE: CPT | Performed by: NURSE PRACTITIONER

## 2022-05-06 PROCEDURE — 83540 ASSAY OF IRON: CPT | Performed by: NURSE PRACTITIONER

## 2022-05-06 PROCEDURE — 84466 ASSAY OF TRANSFERRIN: CPT | Performed by: NURSE PRACTITIONER

## 2022-05-06 PROCEDURE — 86803 HEPATITIS C AB TEST: CPT | Performed by: NURSE PRACTITIONER

## 2022-05-06 PROCEDURE — 86376 MICROSOMAL ANTIBODY EACH: CPT | Performed by: NURSE PRACTITIONER

## 2022-05-06 PROCEDURE — 99213 OFFICE O/P EST LOW 20 MIN: CPT | Performed by: NURSE PRACTITIONER

## 2022-05-07 LAB — THYROPEROXIDASE AB SERPL-ACNC: 51 IU/ML (ref 0–34)

## 2022-05-31 ENCOUNTER — TELEMEDICINE (OUTPATIENT)
Dept: ENDOCRINOLOGY | Facility: CLINIC | Age: 32
End: 2022-05-31

## 2022-05-31 VITALS
DIASTOLIC BLOOD PRESSURE: 82 MMHG | WEIGHT: 151 LBS | BODY MASS INDEX: 25.16 KG/M2 | SYSTOLIC BLOOD PRESSURE: 124 MMHG | HEIGHT: 65 IN

## 2022-05-31 DIAGNOSIS — E55.9 VITAMIN D DEFICIENCY: ICD-10-CM

## 2022-05-31 DIAGNOSIS — E06.3 HASHIMOTO'S THYROIDITIS: Primary | ICD-10-CM

## 2022-05-31 DIAGNOSIS — L70.8 OTHER ACNE: ICD-10-CM

## 2022-05-31 PROBLEM — H16.9 KERATITIS: Status: ACTIVE | Noted: 2020-08-26

## 2022-05-31 PROBLEM — H10.10 ALLERGIC CONJUNCTIVITIS: Status: ACTIVE | Noted: 2020-08-26

## 2022-05-31 PROBLEM — Z97.3: Status: ACTIVE | Noted: 2021-12-23

## 2022-05-31 PROBLEM — H57.89: Status: ACTIVE | Noted: 2021-12-23

## 2022-05-31 PROCEDURE — 99214 OFFICE O/P EST MOD 30 MIN: CPT | Performed by: INTERNAL MEDICINE

## 2022-05-31 RX ORDER — SPIRONOLACTONE 25 MG/1
25 TABLET ORAL DAILY
Qty: 30 TABLET | Refills: 11 | Status: SHIPPED | OUTPATIENT
Start: 2022-05-31 | End: 2022-07-05

## 2022-05-31 RX ORDER — DEXAMETHASONE 1 MG
1 TABLET ORAL ONCE
Qty: 1 TABLET | Refills: 0 | Status: SHIPPED | OUTPATIENT
Start: 2022-05-31 | End: 2022-05-31

## 2022-05-31 NOTE — PROGRESS NOTES
Endocrine Progress Note Outpatient     Patient Care Team:  Suzanne Harris APRN as PCP - General  You have chosen to receive care through a telehealth visit.  Do you consent to use a video/audio connection for your medical care today? Yes    Chief Complaint: Follow-up Hashimoto's: Visit conducted through [facility].    HPI: 30-year-old female with history of Hashimoto thyroiditis and vitamin D deficiency is followed through telehealth.    For Hashimoto's thyroiditis: Currently on levothyroxine 25 mcg p.o. every other day and her thyroid levels are normal.  We will continue that.    Vitamin D deficiency: Currently on vitamin D 5000 units p.o. daily.    Acne: She did try oral contraceptive pill through her OB which did not help her acne.  She stopped it after trying for 3 months.  She continues to have acne.      Past Medical History:   Diagnosis Date   • Allergic    • Anemia    • Anemia    • Anxiety    • Asthma    • Celiac disease    • Colon polyp    • Depression    • Hashimoto's disease 2020   • Headache        Social History     Socioeconomic History   • Marital status:    Tobacco Use   • Smoking status: Never Smoker   • Smokeless tobacco: Never Used   Vaping Use   • Vaping Use: Never used   Substance and Sexual Activity   • Alcohol use: Yes     Comment: one - two glasses of wine weekly    • Drug use: No   • Sexual activity: Yes     Partners: Male       Family History   Problem Relation Age of Onset   • Diabetes Mother    • Miscarriages / Stillbirths Mother    • Breast cancer Mother 48   • Hypertension Father    • Colon polyps Father    • Lung cancer Maternal Grandmother 45        62   • Breast cancer Maternal Grandfather    • Lung cancer Other    • Lung cancer Other    • Lung cancer Other    • Thyroid disease Paternal Grandmother        Allergies   Allergen Reactions   • Gluten Meal Swelling       ROS:   Constitutional:  Admit fatigue, tiredness.    Eyes:  Denies change in visual acuity   HENT:  Denies  nasal congestion or sore throat   Respiratory: denies cough, shortness of breath.   Cardiovascular:  denies chest pain, edema   GI:  Denies abdominal pain, nausea, vomiting.   Musculoskeletal:  Denies back pain or joint pain   Integument:  Admit acne.    Neurologic:  Denies headache, focal weakness or sensory changes   Endocrine:  Denies polyuria or polydipsia   Psychiatric:  Denies depression or anxiety      Vitals:    05/31/22 0753   BP: 124/82       Physical Exam:  GEN: NAD, conversant  FACE: Has acne on the face.  PSYCH: AOX3, appropriate mood, affect normal      Results Review:     I reviewed the patient's new clinical results.    Lab Results   Component Value Date    HGBA1C 5.0 05/27/2020      Lab Results   Component Value Date    GLUCOSE 97 05/06/2022    BUN 13 05/06/2022    CREATININE 0.83 05/06/2022    EGFRIFNONA 91 04/09/2021    BCR 15.7 05/06/2022    K 4.2 05/06/2022    CO2 23.0 05/06/2022    CALCIUM 9.8 05/06/2022    ALBUMIN 4.90 05/06/2022    LABIL2 1.5 06/23/2017    AST 23 05/06/2022    ALT 26 05/06/2022    CHOL 128 05/06/2022    TRIG 63 05/06/2022    LDL 69 05/06/2022    HDL 46 05/06/2022     Lab Results   Component Value Date    TSH 2.200 05/06/2022    FREET4 1.37 04/09/2021    THYROIDAB 51 (H) 05/06/2022     Thyroid Peroxidase Antibody (05/06/2022 09:48)   Thyroid Panel With TSH (05/06/2022 09:48)       Medication Review: Reviewed.       Current Outpatient Medications:   •  B Complex Vitamins (VITAMIN B COMPLEX PO), Take  by mouth., Disp: , Rfl:   •  Cholecalciferol (VITAMIN D) 125 MCG (5000 UT) capsule capsule, Take 1 capsule by mouth Daily., Disp: 30 capsule, Rfl: 3  •  Euthyrox 25 MCG tablet, Take 1 tablet by mouth once daily, Disp: 30 tablet, Rfl: 11  •  ferrous sulfate 325 (65 Fe) MG tablet, Take  by mouth., Disp: , Rfl:   •  Flax Oil-Fish Oil-Borage Oil (FISH-FLAX-BORAGE PO), Take  by mouth. LD 1/15, Disp: , Rfl:   •  Multiple Vitamins-Minerals (MULTIVITAMIN ADULT) tablet, , Disp: , Rfl:   •   Probiotic Product (PROBIOTIC PO), Take  by mouth., Disp: , Rfl:   •  Progesterone 40 % cream, , Disp: , Rfl:       Assessment & Plan   1.  Hashimoto's thyroiditis: Biochemically euthyroid. CPM.      2.  Vitamin D deficiency: She is currently on vitamin D 5000 units p.o. daily.  We will continue that and follow levels.    3.  Acne: Birth control pills did not help.  We will add Aldactone 25 mg p.o. daily.   has vasectomy.  We will follow BMP.  Risk of Aldactone with fetal defect discussed with the patient.  Also will rule out Cushing's by checking dexamethasone suppression test.                  Anthony Post MD FACE.

## 2022-05-31 NOTE — PATIENT INSTRUCTIONS
Start Aldactone 25 mg p.o. daily  Please get dexamethasone suppression test done as follows: Take 1 mg of dexamethasone at 11 PM on a given night and draw serum cortisol next morning at 8 AM.  Check BMP in 2 weeks  Follow-up in 6 months.

## 2022-06-17 ENCOUNTER — LAB (OUTPATIENT)
Dept: LAB | Facility: HOSPITAL | Age: 32
End: 2022-06-17

## 2022-06-17 DIAGNOSIS — L70.8 OTHER ACNE: ICD-10-CM

## 2022-06-17 DIAGNOSIS — E06.3 HASHIMOTO'S THYROIDITIS: ICD-10-CM

## 2022-06-17 DIAGNOSIS — E55.9 VITAMIN D DEFICIENCY: ICD-10-CM

## 2022-06-17 LAB
ANION GAP SERPL CALCULATED.3IONS-SCNC: 14.3 MMOL/L (ref 5–15)
BUN SERPL-MCNC: 16 MG/DL (ref 6–20)
BUN/CREAT SERPL: 21.6 (ref 7–25)
CALCIUM SPEC-SCNC: 9.3 MG/DL (ref 8.6–10.5)
CHLORIDE SERPL-SCNC: 101 MMOL/L (ref 98–107)
CO2 SERPL-SCNC: 23.7 MMOL/L (ref 22–29)
CORTIS SERPL-MCNC: 0.87 MCG/DL
CORTIS SERPL-MCNC: 0.98 MCG/DL
CREAT SERPL-MCNC: 0.74 MG/DL (ref 0.57–1)
EGFRCR SERPLBLD CKD-EPI 2021: 111.1 ML/MIN/1.73
GLUCOSE SERPL-MCNC: 94 MG/DL (ref 65–99)
POTASSIUM SERPL-SCNC: 4.3 MMOL/L (ref 3.5–5.2)
SODIUM SERPL-SCNC: 139 MMOL/L (ref 136–145)

## 2022-06-17 PROCEDURE — 82533 TOTAL CORTISOL: CPT

## 2022-06-17 PROCEDURE — 36415 COLL VENOUS BLD VENIPUNCTURE: CPT

## 2022-06-17 PROCEDURE — 80048 BASIC METABOLIC PNL TOTAL CA: CPT

## 2022-06-20 ENCOUNTER — OFFICE VISIT (OUTPATIENT)
Dept: FAMILY MEDICINE CLINIC | Facility: CLINIC | Age: 32
End: 2022-06-20

## 2022-06-20 VITALS
SYSTOLIC BLOOD PRESSURE: 114 MMHG | WEIGHT: 149 LBS | BODY MASS INDEX: 24.79 KG/M2 | DIASTOLIC BLOOD PRESSURE: 80 MMHG | HEART RATE: 77 BPM | OXYGEN SATURATION: 97 % | TEMPERATURE: 97 F

## 2022-06-20 DIAGNOSIS — U09.9 POST-COVID CHRONIC HEADACHE: Primary | ICD-10-CM

## 2022-06-20 DIAGNOSIS — R51.9 POST-COVID CHRONIC HEADACHE: Primary | ICD-10-CM

## 2022-06-20 DIAGNOSIS — G89.29 POST-COVID CHRONIC HEADACHE: Primary | ICD-10-CM

## 2022-06-20 DIAGNOSIS — J01.00 ACUTE NON-RECURRENT MAXILLARY SINUSITIS: ICD-10-CM

## 2022-06-20 PROCEDURE — 99213 OFFICE O/P EST LOW 20 MIN: CPT | Performed by: FAMILY MEDICINE

## 2022-06-20 RX ORDER — DEXAMETHASONE 1 MG
TABLET ORAL
COMMUNITY
Start: 2022-05-31 | End: 2022-07-05

## 2022-06-20 RX ORDER — CHLORCYCLIZINE HYDROCHLORIDE AND PSEUDOEPHEDRINE HYDROCHLORIDE 25; 60 MG/1; MG/1
1 TABLET ORAL 2 TIMES DAILY PRN
Qty: 42 TABLET | Refills: 0 | Status: SHIPPED | OUTPATIENT
Start: 2022-06-20 | End: 2022-07-05

## 2022-06-20 RX ORDER — PREDNISONE 10 MG/1
10 TABLET ORAL DAILY
Qty: 5 TABLET | Refills: 0 | Status: SHIPPED | OUTPATIENT
Start: 2022-06-20 | End: 2022-06-25

## 2022-06-20 RX ORDER — AMOXICILLIN AND CLAVULANATE POTASSIUM 875; 125 MG/1; MG/1
1 TABLET, FILM COATED ORAL 2 TIMES DAILY
Qty: 20 TABLET | Refills: 0 | Status: SHIPPED | OUTPATIENT
Start: 2022-06-20 | End: 2022-06-30

## 2022-06-20 NOTE — PROGRESS NOTES
Subjective   Demetra Lal is a 31 y.o. female.     Demetra Lal is in for some headache and sinus congestion symptoms after having Covid a few weeks ago. Smell has been affected a little but taste has been okay. Appetite has been normal but energy level is lower than usual. Headaches have not been daily and are more common on waking.  There is no history of chest pain or dyspnea. There is no history of issue with bowel or bladder dysfunction. There is no history of dizziness or lightheadedness. There is no history of issue with sleep or mood. There is no history of issue with present medication.            /80 (BP Location: Left arm, Patient Position: Sitting, Cuff Size: Large Adult)   Pulse 77   Temp 97 °F (36.1 °C) (Temporal)   Wt 67.6 kg (149 lb)   SpO2 97%   BMI 24.79 kg/m²       Chief Complaint   Patient presents with   • Headache     And sinus pressure           Current Outpatient Medications:   •  B Complex Vitamins (VITAMIN B COMPLEX PO), Take  by mouth., Disp: , Rfl:   •  Cholecalciferol (VITAMIN D) 125 MCG (5000 UT) capsule capsule, Take 1 capsule by mouth Daily., Disp: 30 capsule, Rfl: 3  •  dexamethasone (DECADRON) 1 MG tablet, TAKE ONE TABLET BY MOUTH AS A ONE-TIME DOSE AT 11PM AND DRAW CORTISOL NEXT MORNING AT 8 AM, Disp: , Rfl:   •  Euthyrox 25 MCG tablet, Take 1 tablet by mouth once daily, Disp: 30 tablet, Rfl: 11  •  ferrous sulfate 325 (65 Fe) MG tablet, Take  by mouth., Disp: , Rfl:   •  Flax Oil-Fish Oil-Borage Oil (FISH-FLAX-BORAGE PO), Take  by mouth. LD 1/15, Disp: , Rfl:   •  Multiple Vitamins-Minerals (MULTIVITAMIN ADULT) tablet, , Disp: , Rfl:   •  Probiotic Product (PROBIOTIC PO), Take  by mouth., Disp: , Rfl:   •  Progesterone 40 % cream, , Disp: , Rfl:   •  spironolactone (Aldactone) 25 MG tablet, Take 1 tablet by mouth Daily., Disp: 30 tablet, Rfl: 11  •  amoxicillin-clavulanate (Augmentin) 875-125 MG per tablet, Take 1 tablet by mouth 2 (Two) Times a Day for 10  days., Disp: 20 tablet, Rfl: 0  •  Chlorcyclizine-Pseudoephed (Stahist AD) 25-60 MG tablet, Take 1 tablet by mouth 2 (Two) Times a Day As Needed (sinus pressure / congestion)., Disp: 42 tablet, Rfl: 0  •  predniSONE (DELTASONE) 10 MG tablet, Take 1 tablet by mouth Daily for 5 days., Disp: 5 tablet, Rfl: 0        The following portions of the patient's history were reviewed and updated as appropriate: allergies, current medications, past family history, past medical history, past social history, past surgical history, and problem list.    Review of Systems   Unable to perform ROS: Acuity of condition       Objective   Physical Exam  Vitals reviewed.   Constitutional:       Appearance: Normal appearance.   HENT:      Right Ear: Tympanic membrane and ear canal normal.      Left Ear: Tympanic membrane and ear canal normal.      Nose: Congestion and rhinorrhea present.      Mouth/Throat:      Pharynx: Oropharynx is clear.   Eyes:      Conjunctiva/sclera: Conjunctivae normal.      Pupils: Pupils are equal, round, and reactive to light.   Cardiovascular:      Rate and Rhythm: Normal rate and regular rhythm.      Heart sounds: Normal heart sounds.   Pulmonary:      Effort: Pulmonary effort is normal.      Breath sounds: No wheezing or rales.   Abdominal:      General: Bowel sounds are normal.      Palpations: Abdomen is soft.      Tenderness: There is no abdominal tenderness. There is no guarding.   Musculoskeletal:      Cervical back: Neck supple.   Lymphadenopathy:      Cervical: No cervical adenopathy.   Neurological:      General: No focal deficit present.      Mental Status: She is alert and oriented to person, place, and time.           Assessment & Plan   Problems Addressed this Visit    None     Visit Diagnoses     Post-COVID chronic headache    -  Primary    Acute non-recurrent maxillary sinusitis          Diagnoses       Codes Comments    Post-COVID chronic headache    -  Primary ICD-10-CM: R51.9, U09.9,  G89.29  ICD-9-CM: 784.0, 139.8, 338.29     Acute non-recurrent maxillary sinusitis     ICD-10-CM: J01.00  ICD-9-CM: 461.0         I will put her on some Augmentin and Stahist  I will give her some prednisone for a few days  She stated to me there was no possibility of pregnancy  I suspect this is a sinus infection that is an opportunistic infection after her recent COVID  I will have her contact me in a few days to let me know if she is improving or not improving  I will adjust treatment plan as indicated

## 2022-06-22 ENCOUNTER — TELEPHONE (OUTPATIENT)
Dept: ENDOCRINOLOGY | Facility: CLINIC | Age: 32
End: 2022-06-22

## 2022-06-22 NOTE — TELEPHONE ENCOUNTER
She is asking if she should re test her Potassium levels. You both spoke about t during the last office visit. She is moving to [place] and wants to know when she should repeat the test. Also wants to check the reverse T3 do you think she will benefit from T3 and T4 to help with her weight gain and food sensitivity.

## 2022-07-05 ENCOUNTER — OFFICE VISIT (OUTPATIENT)
Dept: FAMILY MEDICINE CLINIC | Facility: CLINIC | Age: 32
End: 2022-07-05

## 2022-07-05 VITALS
OXYGEN SATURATION: 100 % | WEIGHT: 149 LBS | DIASTOLIC BLOOD PRESSURE: 82 MMHG | BODY MASS INDEX: 24.79 KG/M2 | HEART RATE: 80 BPM | TEMPERATURE: 96.8 F | SYSTOLIC BLOOD PRESSURE: 121 MMHG

## 2022-07-05 DIAGNOSIS — R21 RASH: ICD-10-CM

## 2022-07-05 DIAGNOSIS — M25.50 ARTHRALGIA, UNSPECIFIED JOINT: Primary | ICD-10-CM

## 2022-07-05 PROCEDURE — 99213 OFFICE O/P EST LOW 20 MIN: CPT | Performed by: NURSE PRACTITIONER

## 2022-07-05 RX ORDER — ALBUTEROL SULFATE 90 UG/1
AEROSOL, METERED RESPIRATORY (INHALATION)
COMMUNITY
Start: 2022-06-22

## 2022-07-05 RX ORDER — MELOXICAM 15 MG/1
15 TABLET ORAL DAILY
Qty: 30 TABLET | Refills: 0 | Status: SHIPPED | OUTPATIENT
Start: 2022-07-05 | End: 2022-08-05 | Stop reason: SDUPTHER

## 2022-07-05 NOTE — PROGRESS NOTES
Subjective     Demetra Lal is a 31 y.o. female.     Patient is here today with complaints of joint pain.  She reports that this has been ongoing.  States it is primarily in bilateral knees, hips, lower back, and neck.  She has had a left knee injury in the past.  She sees an integrative doctor in [place].  Father has psoriatic arthritis.  She reports she has Hashimoto thyroid disease and celiac (assumed) and is concerned this could be another autoimmune disorder.  She often has rashes as well.   She states they are reddened areas.  She uses a steroid cream.  She has also been having acne since her son was born.  She had an KAMILA, CRP, and ESR done last year that was normal.   She applied heat to her joints and takes CBD.  Will take ibuprofen or tylenol as needed without much relief.        The following portions of the patient's history were reviewed and updated as appropriate: allergies, current medications, past family history, past medical history, past social history, past surgical history and problem list.    Review of Systems   Constitutional: Positive for diaphoresis. Negative for chills, fatigue and fever.   Respiratory: Negative for chest tightness and shortness of breath.    Cardiovascular: Negative for chest pain and palpitations.   Musculoskeletal: Positive for arthralgias.   Skin: Positive for rash.   Neurological: Negative for dizziness and headache.       Objective     /82   Pulse 80   Temp 96.8 °F (36 °C) (Tympanic)   Wt 67.6 kg (149 lb)   SpO2 100%   BMI 24.79 kg/m²     Current Outpatient Medications on File Prior to Visit   Medication Sig Dispense Refill   • albuterol sulfate  (90 Base) MCG/ACT inhaler      • B Complex Vitamins (VITAMIN B COMPLEX PO) Take  by mouth.     • Cholecalciferol (VITAMIN D) 125 MCG (5000 UT) capsule capsule Take 1 capsule by mouth Daily. 30 capsule 3   • Euthyrox 25 MCG tablet Take 1 tablet by mouth once daily 30 tablet 11   • ferrous sulfate 325 (65  Fe) MG tablet Take  by mouth.     • Flax Oil-Fish Oil-Borage Oil (FISH-FLAX-BORAGE PO) Take  by mouth. LD 1/15     • Multiple Vitamins-Minerals (MULTIVITAMIN ADULT) tablet      • Probiotic Product (PROBIOTIC PO) Take  by mouth.     • Progesterone 40 % cream      • [DISCONTINUED] Chlorcyclizine-Pseudoephed (Stahist AD) 25-60 MG tablet Take 1 tablet by mouth 2 (Two) Times a Day As Needed (sinus pressure / congestion). 42 tablet 0   • [DISCONTINUED] dexamethasone (DECADRON) 1 MG tablet TAKE ONE TABLET BY MOUTH AS A ONE-TIME DOSE AT 11PM AND DRAW CORTISOL NEXT MORNING AT 8 AM     • [DISCONTINUED] spironolactone (Aldactone) 25 MG tablet Take 1 tablet by mouth Daily. 30 tablet 11     No current facility-administered medications on file prior to visit.        Physical Exam  Vitals reviewed.   Constitutional:       General: She is not in acute distress.     Appearance: Normal appearance. She is well-developed. She is not diaphoretic.   HENT:      Head: Normocephalic and atraumatic.   Eyes:      General:         Right eye: No discharge.         Left eye: No discharge.      Extraocular Movements: Extraocular movements intact.      Conjunctiva/sclera: Conjunctivae normal.   Cardiovascular:      Rate and Rhythm: Normal rate and regular rhythm.      Heart sounds: No murmur heard.  Pulmonary:      Effort: Pulmonary effort is normal. No respiratory distress.      Breath sounds: Normal breath sounds. No wheezing or rales.   Abdominal:      General: Bowel sounds are normal.      Palpations: Abdomen is soft.   Musculoskeletal:         General: Normal range of motion.      Cervical back: Normal range of motion.   Skin:     General: Skin is warm and dry.      Comments: Raised papules on face   Neurological:      General: No focal deficit present.      Mental Status: She is alert and oriented to person, place, and time.   Psychiatric:         Mood and Affect: Mood normal.         Behavior: Behavior normal.         Thought Content:  Thought content normal.         Judgment: Judgment normal.           Assessment & Plan     Diagnoses and all orders for this visit:    1. Arthralgia, unspecified joint (Primary)  Comments:  possibly autoimmune  last KAMILA. CRP, and ESR normal  referral to rheum  start mobic  f/u 1 mo  Orders:  -     Ambulatory Referral to Rheumatology  -     meloxicam (Mobic) 15 MG tablet; Take 1 tablet by mouth Daily.  Dispense: 30 tablet; Refill: 0    2. Rash  Comments:  acne vs psoriasis  has steroid  sees derm  referral to rheum  Orders:  -     Ambulatory Referral to Rheumatology

## 2022-07-22 ENCOUNTER — LAB (OUTPATIENT)
Dept: LAB | Facility: HOSPITAL | Age: 32
End: 2022-07-22

## 2022-07-22 ENCOUNTER — TRANSCRIBE ORDERS (OUTPATIENT)
Dept: ADMINISTRATIVE | Facility: HOSPITAL | Age: 32
End: 2022-07-22

## 2022-07-22 DIAGNOSIS — M06.89 OTHER SPECIFIED RHEUMATOID ARTHRITIS, MULTIPLE SITES: ICD-10-CM

## 2022-07-22 DIAGNOSIS — E03.9 ACQUIRED HYPOTHYROIDISM: ICD-10-CM

## 2022-07-22 DIAGNOSIS — E03.9 ACQUIRED HYPOTHYROIDISM: Primary | ICD-10-CM

## 2022-07-22 LAB
CRP SERPL-MCNC: 0.04 MG/DL (ref 0.01–0.5)
ERYTHROCYTE [SEDIMENTATION RATE] IN BLOOD: 1 MM/HR (ref 0–20)
FERRITIN SERPL-MCNC: 114 NG/ML (ref 13–150)
IRON 24H UR-MRATE: 71 MCG/DL (ref 37–145)
T3FREE SERPL-MCNC: 2.86 PG/ML (ref 2–4.4)
T4 FREE SERPL-MCNC: 1.57 NG/DL (ref 0.93–1.7)
TSH SERPL DL<=0.05 MIU/L-ACNC: 1.46 UIU/ML (ref 0.27–4.2)

## 2022-07-22 PROCEDURE — 86141 C-REACTIVE PROTEIN HS: CPT

## 2022-07-22 PROCEDURE — 84482 T3 REVERSE: CPT

## 2022-07-22 PROCEDURE — 36415 COLL VENOUS BLD VENIPUNCTURE: CPT

## 2022-07-22 PROCEDURE — 82728 ASSAY OF FERRITIN: CPT

## 2022-07-22 PROCEDURE — 85652 RBC SED RATE AUTOMATED: CPT

## 2022-07-22 PROCEDURE — 84443 ASSAY THYROID STIM HORMONE: CPT

## 2022-07-22 PROCEDURE — 84439 ASSAY OF FREE THYROXINE: CPT

## 2022-07-22 PROCEDURE — 84481 FREE ASSAY (FT-3): CPT

## 2022-07-22 PROCEDURE — 83540 ASSAY OF IRON: CPT

## 2022-07-27 LAB — T3REVERSE SERPL-MCNC: 14.8 NG/DL (ref 9.2–24.1)

## 2022-08-05 ENCOUNTER — OFFICE VISIT (OUTPATIENT)
Dept: FAMILY MEDICINE CLINIC | Facility: CLINIC | Age: 32
End: 2022-08-05

## 2022-08-05 VITALS
TEMPERATURE: 97.8 F | HEART RATE: 79 BPM | SYSTOLIC BLOOD PRESSURE: 134 MMHG | OXYGEN SATURATION: 97 % | DIASTOLIC BLOOD PRESSURE: 87 MMHG | WEIGHT: 148 LBS | BODY MASS INDEX: 24.63 KG/M2

## 2022-08-05 DIAGNOSIS — M25.50 ARTHRALGIA, UNSPECIFIED JOINT: Primary | ICD-10-CM

## 2022-08-05 DIAGNOSIS — R03.0 ELEVATED BP WITHOUT DIAGNOSIS OF HYPERTENSION: ICD-10-CM

## 2022-08-05 PROCEDURE — 99214 OFFICE O/P EST MOD 30 MIN: CPT | Performed by: NURSE PRACTITIONER

## 2022-08-05 RX ORDER — METHYLPREDNISOLONE 4 MG/1
TABLET ORAL
Qty: 21 TABLET | Refills: 0 | Status: SHIPPED | OUTPATIENT
Start: 2022-08-05

## 2022-08-05 RX ORDER — MELOXICAM 15 MG/1
15 TABLET ORAL DAILY
Qty: 30 TABLET | Refills: 0 | Status: SHIPPED | OUTPATIENT
Start: 2022-08-05

## 2022-08-05 NOTE — PROGRESS NOTES
Subjective     Demetra Lal is a 31 y.o. female.     Patient is here today for 1 month follow-up on arthritic joint pain.  Patient was referred to rheumatology at her last visit.  Her appt is at the end of the month.   She was also started on meloxicam 15 mg daily.  Patient reports that since starting the medication she has seen an improvement in her pain.  She takes tylenol some on top of it.  She has been having pain from the wrist to her shoulder.  Denies any abdominal pain while on the medication.  She will be moving to [place] soon.  She is having increased pain in her right wrist, elbow, and shoulder.  She is wearing a compression sleeve.  Pain is constant.  Takes the meloxicam and tylenol.     Pt feels like she has been having increase blood pressure.  She feels spikes throughout the day.  It has been running a little higher.  She was previously on spiranolactone for acne.         The following portions of the patient's history were reviewed and updated as appropriate: allergies, current medications, past family history, past medical history, past social history, past surgical history and problem list.    Review of Systems   Constitutional: Positive for fatigue. Negative for chills and fever.   Respiratory: Negative for chest tightness and shortness of breath.    Cardiovascular: Negative for chest pain and palpitations.   Musculoskeletal: Positive for arthralgias.        Increased pain right wrist, elbow, and shoulder   Neurological: Negative for dizziness and headache.       Objective     /87 (BP Location: Left arm, Patient Position: Sitting, Cuff Size: Adult)   Pulse 79   Temp 97.8 °F (36.6 °C) (Tympanic)   Wt 67.1 kg (148 lb)   SpO2 97%   BMI 24.63 kg/m²     Current Outpatient Medications on File Prior to Visit   Medication Sig Dispense Refill   • albuterol sulfate  (90 Base) MCG/ACT inhaler      • B Complex Vitamins (VITAMIN B COMPLEX PO) Take  by mouth.     • Cholecalciferol (VITAMIN D)  125 MCG (5000 UT) capsule capsule Take 1 capsule by mouth Daily. 30 capsule 3   • Euthyrox 25 MCG tablet Take 1 tablet by mouth once daily 30 tablet 11   • ferrous sulfate 325 (65 Fe) MG tablet Take  by mouth.     • Flax Oil-Fish Oil-Borage Oil (FISH-FLAX-BORAGE PO) Take  by mouth. LD 1/15     • Multiple Vitamins-Minerals (MULTIVITAMIN ADULT) tablet      • Probiotic Product (PROBIOTIC PO) Take  by mouth.     • Progesterone 40 % cream      • [DISCONTINUED] meloxicam (Mobic) 15 MG tablet Take 1 tablet by mouth Daily. 30 tablet 0     No current facility-administered medications on file prior to visit.        Physical Exam  Constitutional:       Appearance: Normal appearance. She is not ill-appearing.   HENT:      Head: Normocephalic and atraumatic.   Cardiovascular:      Rate and Rhythm: Normal rate and regular rhythm.      Heart sounds: No murmur heard.  Pulmonary:      Effort: Pulmonary effort is normal. No respiratory distress.   Musculoskeletal:         General: Normal range of motion.   Skin:     General: Skin is warm and dry.   Neurological:      General: No focal deficit present.      Mental Status: She is alert and oriented to person, place, and time.   Psychiatric:         Mood and Affect: Mood normal.         Behavior: Behavior normal.         Thought Content: Thought content normal.         Judgment: Judgment normal.           Assessment & Plan     Diagnoses and all orders for this visit:    1. Arthralgia, unspecified joint (Primary)  Comments:  possibly autoimmune  having increased inflammation in right arm/shoudler  setroid pack  cont mobic- helps  awaiting appt with rheum  Orders:  -     meloxicam (Mobic) 15 MG tablet; Take 1 tablet by mouth Daily.  Dispense: 30 tablet; Refill: 0    2. Elevated BP without diagnosis of hypertension  Comments:  monitor BP and HR  limit caffeine  message me readings

## 2022-10-26 ENCOUNTER — TELEPHONE (OUTPATIENT)
Dept: ENDOCRINOLOGY | Facility: CLINIC | Age: 32
End: 2022-10-26

## 2022-10-26 NOTE — TELEPHONE ENCOUNTER
She is living in [place] and she is out of Euthyron  25 mcg can we send a RX. Has not got a Endocrinologist yet. Asking for 2-3 months supply  [facility] [address] [place] Phar # [phone]

## 2022-10-28 RX ORDER — LEVOTHYROXINE SODIUM 0.03 MG/1
25 TABLET ORAL DAILY
Qty: 30 TABLET | Refills: 2 | Status: SHIPPED | OUTPATIENT
Start: 2022-10-28

## (undated) DEVICE — PK ENDO GI 50

## (undated) DEVICE — TRAP WIDEEYE POLYP

## (undated) DEVICE — SNAR POLYP CAPTIVATOR OVL 13MM 240CM

## (undated) DEVICE — BITEBLOCK ENDO W/STRAP 60F A/ LF DISP

## (undated) DEVICE — PAPR PRNT PK SONY W RIBN UPC55

## (undated) DEVICE — SINGLE-USE BIOPSY FORCEPS: Brand: RADIAL JAW 4